# Patient Record
Sex: FEMALE | Race: WHITE | NOT HISPANIC OR LATINO | URBAN - METROPOLITAN AREA
[De-identification: names, ages, dates, MRNs, and addresses within clinical notes are randomized per-mention and may not be internally consistent; named-entity substitution may affect disease eponyms.]

---

## 2017-11-11 ENCOUNTER — EMERGENCY (EMERGENCY)
Facility: HOSPITAL | Age: 67
LOS: 1 days | Discharge: HOME | End: 2017-11-11
Attending: EMERGENCY MEDICINE | Admitting: EMERGENCY MEDICINE
Payer: MEDICARE

## 2017-11-11 VITALS
OXYGEN SATURATION: 100 % | SYSTOLIC BLOOD PRESSURE: 126 MMHG | HEIGHT: 64 IN | HEART RATE: 74 BPM | RESPIRATION RATE: 16 BRPM | WEIGHT: 119.05 LBS | DIASTOLIC BLOOD PRESSURE: 61 MMHG | TEMPERATURE: 98 F

## 2017-11-11 LAB
ALBUMIN SERPL ELPH-MCNC: 3.8 G/DL — SIGNIFICANT CHANGE UP (ref 3.3–5)
ALP SERPL-CCNC: 62 U/L — SIGNIFICANT CHANGE UP (ref 30–120)
ALT FLD-CCNC: 26 U/L DA — SIGNIFICANT CHANGE UP (ref 10–60)
ANION GAP SERPL CALC-SCNC: 8 MMOL/L — SIGNIFICANT CHANGE UP (ref 5–17)
APTT BLD: 30.2 SEC — SIGNIFICANT CHANGE UP (ref 27.5–37.4)
AST SERPL-CCNC: 25 U/L — SIGNIFICANT CHANGE UP (ref 10–40)
BASOPHILS # BLD AUTO: 0.1 K/UL — SIGNIFICANT CHANGE UP (ref 0–0.2)
BASOPHILS NFR BLD AUTO: 1.4 % — SIGNIFICANT CHANGE UP (ref 0–2)
BILIRUB SERPL-MCNC: 0.4 MG/DL — SIGNIFICANT CHANGE UP (ref 0.2–1.2)
BUN SERPL-MCNC: 20 MG/DL — SIGNIFICANT CHANGE UP (ref 7–23)
CALCIUM SERPL-MCNC: 9.3 MG/DL — SIGNIFICANT CHANGE UP (ref 8.4–10.5)
CHLORIDE SERPL-SCNC: 102 MMOL/L — SIGNIFICANT CHANGE UP (ref 96–108)
CK MB CFR SERPL CALC: 1 NG/ML — SIGNIFICANT CHANGE UP (ref 0–3.6)
CO2 SERPL-SCNC: 30 MMOL/L — SIGNIFICANT CHANGE UP (ref 22–31)
CREAT SERPL-MCNC: 0.95 MG/DL — SIGNIFICANT CHANGE UP (ref 0.5–1.3)
EOSINOPHIL # BLD AUTO: 0.1 K/UL — SIGNIFICANT CHANGE UP (ref 0–0.5)
EOSINOPHIL NFR BLD AUTO: 1.8 % — SIGNIFICANT CHANGE UP (ref 0–6)
GLUCOSE SERPL-MCNC: 115 MG/DL — HIGH (ref 70–99)
HCT VFR BLD CALC: 43.1 % — SIGNIFICANT CHANGE UP (ref 34.5–45)
HGB BLD-MCNC: 13.7 G/DL — SIGNIFICANT CHANGE UP (ref 11.5–15.5)
INR BLD: 0.99 RATIO — SIGNIFICANT CHANGE UP (ref 0.88–1.16)
LYMPHOCYTES # BLD AUTO: 2.5 K/UL — SIGNIFICANT CHANGE UP (ref 1–3.3)
LYMPHOCYTES # BLD AUTO: 37.8 % — SIGNIFICANT CHANGE UP (ref 13–44)
MCHC RBC-ENTMCNC: 30.1 PG — SIGNIFICANT CHANGE UP (ref 27–34)
MCHC RBC-ENTMCNC: 31.7 GM/DL — LOW (ref 32–36)
MCV RBC AUTO: 95 FL — SIGNIFICANT CHANGE UP (ref 80–100)
MONOCYTES # BLD AUTO: 0.5 K/UL — SIGNIFICANT CHANGE UP (ref 0–0.9)
MONOCYTES NFR BLD AUTO: 7.7 % — SIGNIFICANT CHANGE UP (ref 2–14)
NEUTROPHILS # BLD AUTO: 3.4 K/UL — SIGNIFICANT CHANGE UP (ref 1.8–7.4)
NEUTROPHILS NFR BLD AUTO: 51.2 % — SIGNIFICANT CHANGE UP (ref 43–77)
PLATELET # BLD AUTO: 253 K/UL — SIGNIFICANT CHANGE UP (ref 150–400)
POTASSIUM SERPL-MCNC: 3.2 MMOL/L — LOW (ref 3.5–5.3)
POTASSIUM SERPL-SCNC: 3.2 MMOL/L — LOW (ref 3.5–5.3)
PROT SERPL-MCNC: 7.7 G/DL — SIGNIFICANT CHANGE UP (ref 6–8.3)
PROTHROM AB SERPL-ACNC: 10.8 SEC — SIGNIFICANT CHANGE UP (ref 9.8–12.7)
RBC # BLD: 4.54 M/UL — SIGNIFICANT CHANGE UP (ref 3.8–5.2)
RBC # FLD: 12.6 % — SIGNIFICANT CHANGE UP (ref 10.3–14.5)
SODIUM SERPL-SCNC: 140 MMOL/L — SIGNIFICANT CHANGE UP (ref 135–145)
TROPONIN I SERPL-MCNC: 0 NG/ML — LOW (ref 0.02–0.06)
WBC # BLD: 6.7 K/UL — SIGNIFICANT CHANGE UP (ref 3.8–10.5)
WBC # FLD AUTO: 6.7 K/UL — SIGNIFICANT CHANGE UP (ref 3.8–10.5)

## 2017-11-11 PROCEDURE — 93005 ELECTROCARDIOGRAM TRACING: CPT

## 2017-11-11 PROCEDURE — 93010 ELECTROCARDIOGRAM REPORT: CPT

## 2017-11-11 PROCEDURE — 84484 ASSAY OF TROPONIN QUANT: CPT

## 2017-11-11 PROCEDURE — 80053 COMPREHEN METABOLIC PANEL: CPT

## 2017-11-11 PROCEDURE — 82553 CREATINE MB FRACTION: CPT

## 2017-11-11 PROCEDURE — 36415 COLL VENOUS BLD VENIPUNCTURE: CPT

## 2017-11-11 PROCEDURE — 99283 EMERGENCY DEPT VISIT LOW MDM: CPT | Mod: 25

## 2017-11-11 PROCEDURE — 99284 EMERGENCY DEPT VISIT MOD MDM: CPT

## 2017-11-11 PROCEDURE — 85610 PROTHROMBIN TIME: CPT

## 2017-11-11 PROCEDURE — 85730 THROMBOPLASTIN TIME PARTIAL: CPT

## 2017-11-11 PROCEDURE — 85027 COMPLETE CBC AUTOMATED: CPT

## 2017-11-11 RX ORDER — POTASSIUM CHLORIDE 20 MEQ
40 PACKET (EA) ORAL ONCE
Qty: 0 | Refills: 0 | Status: DISCONTINUED | OUTPATIENT
Start: 2017-11-11 | End: 2017-11-15

## 2017-11-11 NOTE — ED PROVIDER NOTE - OBJECTIVE STATEMENT
67 y/o F pt w/ no significant PMHx presents to the ED c/o lightheaded, dizziness and syncopal episode x approx. 1 hour ago. Pt states she ate a lot during the cocktail hour at a wedding and had what seemed like a very strong alcoholic drink. States she stood up to take a picture and she felt very dizzy so she sat back down and was told she passed out. Pt states she took Excedrin today because she gets migraines, denies any other medication use. States a couple years ago she had a workup for her heart but is unsure of what tests she had done. Pt denies any FHx of heart disease, tobacco use, fever or any other complaints at this time.

## 2017-11-11 NOTE — ED PROVIDER NOTE - PROGRESS NOTE DETAILS
Discussed all results with patient. Discussed option of d/c vs. repeat enzymes. Patient opting to leave, will f/u with pmd

## 2017-11-11 NOTE — ED PROVIDER NOTE - NS_ ATTENDINGSCRIBEDETAILS _ED_A_ED_FT
Curtis Vizcarra MD - The scribe's documentation has been prepared under my direction and personally reviewed by me in its entirety. I confirm that the note above accurately reflects all work, treatment, procedures, and medical decision making performed by me.

## 2017-11-11 NOTE — ED ADULT NURSE NOTE - NSSISCREENINGQ1_ED_A_ED
1. Have you been to the ER, urgent care clinic since your last visit? Hospitalized since your last visit? no  2. Have you seen or consulted any other health care providers outside of the [facility] since your last visit? Include any pap smears or colon screening.  no No

## 2019-12-30 PROBLEM — Z00.00 ENCOUNTER FOR PREVENTIVE HEALTH EXAMINATION: Status: ACTIVE | Noted: 2019-12-30

## 2020-01-06 ENCOUNTER — APPOINTMENT (OUTPATIENT)
Dept: UROGYNECOLOGY | Facility: CLINIC | Age: 70
End: 2020-01-06
Payer: MEDICARE

## 2020-01-06 VITALS
BODY MASS INDEX: 21.62 KG/M2 | DIASTOLIC BLOOD PRESSURE: 81 MMHG | WEIGHT: 122 LBS | HEIGHT: 63 IN | SYSTOLIC BLOOD PRESSURE: 155 MMHG

## 2020-01-06 DIAGNOSIS — Z83.3 FAMILY HISTORY OF DIABETES MELLITUS: ICD-10-CM

## 2020-01-06 DIAGNOSIS — Z87.42 PERSONAL HISTORY OF OTHER DISEASES OF THE FEMALE GENITAL TRACT: ICD-10-CM

## 2020-01-06 DIAGNOSIS — Z86.018 PERSONAL HISTORY OF OTHER BENIGN NEOPLASM: ICD-10-CM

## 2020-01-06 DIAGNOSIS — Z82.3 FAMILY HISTORY OF STROKE: ICD-10-CM

## 2020-01-06 DIAGNOSIS — E78.00 PURE HYPERCHOLESTEROLEMIA, UNSPECIFIED: ICD-10-CM

## 2020-01-06 DIAGNOSIS — Z80.3 FAMILY HISTORY OF MALIGNANT NEOPLASM OF BREAST: ICD-10-CM

## 2020-01-06 LAB
BILIRUB UR QL STRIP: NEGATIVE
CLARITY UR: NORMAL
COLLECTION METHOD: NORMAL
GLUCOSE UR-MCNC: NEGATIVE
HCG UR QL: 0.2 EU/DL
HGB UR QL STRIP.AUTO: NEGATIVE
KETONES UR-MCNC: NEGATIVE
LEUKOCYTE ESTERASE UR QL STRIP: NORMAL
NITRITE UR QL STRIP: NEGATIVE
PH UR STRIP: 6
PROT UR STRIP-MCNC: NEGATIVE
SP GR UR STRIP: 1.01

## 2020-01-06 PROCEDURE — 99204 OFFICE O/P NEW MOD 45 MIN: CPT | Mod: 25

## 2020-01-06 PROCEDURE — 51701 INSERT BLADDER CATHETER: CPT

## 2020-01-06 PROCEDURE — 81003 URINALYSIS AUTO W/O SCOPE: CPT | Mod: QW

## 2020-01-06 RX ORDER — CALCIUM CITRATE/VITAMIN D3 315MG-6.25
TABLET ORAL
Refills: 0 | Status: ACTIVE | COMMUNITY

## 2020-01-06 RX ORDER — MULTIVIT-MIN/IRON/FOLIC ACID/K 18-600-40
CAPSULE ORAL
Refills: 0 | Status: ACTIVE | COMMUNITY

## 2020-01-06 RX ORDER — VITAMIN B COMPLEX
TABLET ORAL
Refills: 0 | Status: ACTIVE | COMMUNITY

## 2020-01-06 RX ORDER — MULTIVITAMIN
TABLET ORAL
Refills: 0 | Status: ACTIVE | COMMUNITY

## 2020-01-06 RX ORDER — ATORVASTATIN CALCIUM 80 MG/1
TABLET, FILM COATED ORAL
Refills: 0 | Status: ACTIVE | COMMUNITY

## 2020-01-06 RX ORDER — ECHINACEA 400 MG
CAPSULE ORAL
Refills: 0 | Status: ACTIVE | COMMUNITY

## 2020-01-06 NOTE — PHYSICAL EXAM
[No Acute Distress] : in no acute distress [Well Nourished] : ~L well nourished [Well developed] : well developed [Oriented x3] : oriented to person, place, and time [No Edema] : ~T edema was not present [Warm and Dry] : was warm and dry to touch [Normal Gait] : gait was normal [Normal Appearance] : general appearance was normal [Atrophy] : atrophy [2] : 2 [Aa ____] : Aa [unfilled] [Ba ____] : Ba [unfilled] [GH ____] : GH [unfilled] [C ____] : C [unfilled] [PB ____] : PB [unfilled] [Ap ____] : Ap [unfilled] [TVL ____] : TVL  [unfilled] [Bp ____] : Bp [unfilled] [D ____] : D [unfilled] [Normal] : normal [Post Void Residual ____ml] : post void residual via catheterization was [unfilled] ml [Cough] : no cough [Distended] : not distended [Tenderness] : ~T no ~M abdominal tenderness observed [Hernia] : no hernia observed [FreeTextEntry3] : Negative empty stress test

## 2020-01-06 NOTE — OB HISTORY
[Vaginal ___] : [unfilled] vaginal delivery(s) [Definite ___ (Date)] : the last menstrual period was [unfilled] [Last Pap Smear ___] : date of last pap smear was on [unfilled] [Abnormal Pap Smear] : normal pap smear [Taking Estrogens] : is not taking estrogen replacement [Sexually Active] : is not sexually active [FreeTextEntry1] : Baby weighed 7#13 vacuum assisted.

## 2020-01-06 NOTE — DISCUSSION/SUMMARY
[FreeTextEntry1] : Mrs. REESE is a 69-year-old with uterovaginal prolapse, midline cystocele, occult stress incontinence, elevated postvoid residual. We discussed the kinking of her urethra which can account for her elvated PVR and the occult BRENDA. We talked about the etiology and natural progression of pelvic organ prolapse. We discussed the treatment options of a pessary, physical therapy or surgery. In terms of surgery we talked about open procedures, robotic assisted procedures and vaginal reconstruction with or without the utilization of graft material. I recommended bladder testing if she desires surgery. I asked her to sign a release to obtain her most recent pelvic U/S. I gave her some literature on pelvic floor muscle strengthening. I sent her urine to the lab. At this time she wants to hold off on surgery. I referred her for PT and asked her to come back in  6 months to reevaluate her situation. I was able to answer all of her questions. We discussed trying a different pessary and one with a knob but at this time she declined. \par

## 2020-01-06 NOTE — LETTER BODY
[Dear  ___] : Dear  [unfilled], [I had the pleasure of evaluating your patient, [unfilled]. Thank you for referring Ms. [unfilled] for consultation for ___] : I had the pleasure of evaluating your patient, [unfilled]. Thank you for referring Ms. [unfilled] for consultation for [unfilled]. [Attached please find my note.] : Attached please find my note. [Thank you very much for allowing me to participate in the care of this patient. If you have any questions, please do not hesitate to contact me] : Thank you very much for allowing me to participate in the care of this patient. If you have any questions, please do not hesitate to contact me. [DrDiego  ___] : Dr. PAZ

## 2020-01-06 NOTE — HISTORY OF PRESENT ILLNESS
[FreeTextEntry1] : 68 yo  female with complaints of prolapse. Told by GYN that her uterus has been prolapsing for years but seems to have gotten worse over the last 2 years. She is very active, horseback rides and exercises. Told she was a stage 3 or 4. She tried a pessary but she started to leak with it in so she had it taken out. She denies pain but she does feel heaviness vaginally. Sometimes will feel strong urge and nothing will come out. Feels she voids frequently. Gets up 2-3 times at night. No leaking, except when the pessary was in. Has some urgency. Not wearing pads. No bowel complaints. Has regular pelvic U/S for fibroids - the last one was within the last year. Went to 1 session of PT and was told it would only strengthen her core.

## 2020-01-07 ENCOUNTER — RESULT REVIEW (OUTPATIENT)
Age: 70
End: 2020-01-07

## 2020-01-08 ENCOUNTER — RESULT REVIEW (OUTPATIENT)
Age: 70
End: 2020-01-08

## 2020-07-22 DIAGNOSIS — N39.0 URINARY TRACT INFECTION, SITE NOT SPECIFIED: ICD-10-CM

## 2020-09-21 ENCOUNTER — APPOINTMENT (OUTPATIENT)
Dept: UROGYNECOLOGY | Facility: CLINIC | Age: 70
End: 2020-09-21

## 2020-09-25 LAB
APPEARANCE: CLEAR
BACTERIA UR CULT: NORMAL
BACTERIA: NEGATIVE
BILIRUBIN URINE: NEGATIVE
BLOOD URINE: NEGATIVE
COLOR: COLORLESS
GLUCOSE QUALITATIVE U: NEGATIVE
HYALINE CASTS: 0 /LPF
KETONES URINE: NEGATIVE
LEUKOCYTE ESTERASE URINE: NEGATIVE
MICROSCOPIC-UA: NORMAL
NITRITE URINE: NEGATIVE
PH URINE: 6.5
PROTEIN URINE: NEGATIVE
RED BLOOD CELLS URINE: 0 /HPF
SPECIFIC GRAVITY URINE: 1
SQUAMOUS EPITHELIAL CELLS: 0 /HPF
UROBILINOGEN URINE: NORMAL
WHITE BLOOD CELLS URINE: 0 /HPF

## 2022-02-18 ENCOUNTER — APPOINTMENT (OUTPATIENT)
Dept: OPHTHALMOLOGY | Facility: CLINIC | Age: 72
End: 2022-02-18
Payer: MEDICARE

## 2022-02-18 ENCOUNTER — NON-APPOINTMENT (OUTPATIENT)
Age: 72
End: 2022-02-18

## 2022-02-18 PROCEDURE — 92020 GONIOSCOPY: CPT

## 2022-02-18 PROCEDURE — 92004 COMPRE OPH EXAM NEW PT 1/>: CPT

## 2022-02-18 PROCEDURE — 92133 CPTRZD OPH DX IMG PST SGM ON: CPT

## 2022-03-23 ENCOUNTER — APPOINTMENT (OUTPATIENT)
Dept: OPHTHALMOLOGY | Facility: CLINIC | Age: 72
End: 2022-03-23
Payer: MEDICARE

## 2022-03-23 ENCOUNTER — NON-APPOINTMENT (OUTPATIENT)
Age: 72
End: 2022-03-23

## 2022-03-23 PROCEDURE — 92020 GONIOSCOPY: CPT

## 2022-03-23 PROCEDURE — 92012 INTRM OPH EXAM EST PATIENT: CPT

## 2022-05-20 ENCOUNTER — NON-APPOINTMENT (OUTPATIENT)
Age: 72
End: 2022-05-20

## 2022-05-20 ENCOUNTER — APPOINTMENT (OUTPATIENT)
Dept: OPHTHALMOLOGY | Facility: CLINIC | Age: 72
End: 2022-05-20
Payer: MEDICARE

## 2022-05-20 PROCEDURE — 92083 EXTENDED VISUAL FIELD XM: CPT

## 2022-05-20 PROCEDURE — 92012 INTRM OPH EXAM EST PATIENT: CPT

## 2023-01-27 ENCOUNTER — NON-APPOINTMENT (OUTPATIENT)
Age: 73
End: 2023-01-27

## 2023-01-27 ENCOUNTER — APPOINTMENT (OUTPATIENT)
Dept: OPHTHALMOLOGY | Facility: CLINIC | Age: 73
End: 2023-01-27
Payer: MEDICARE

## 2023-01-27 PROCEDURE — 92014 COMPRE OPH EXAM EST PT 1/>: CPT

## 2023-01-27 PROCEDURE — 92133 CPTRZD OPH DX IMG PST SGM ON: CPT

## 2023-05-05 ENCOUNTER — APPOINTMENT (OUTPATIENT)
Dept: OPHTHALMOLOGY | Facility: CLINIC | Age: 73
End: 2023-05-05

## 2023-06-12 PROBLEM — G43.909 MIGRAINE: Status: ACTIVE | Noted: 2023-06-12

## 2023-06-12 PROBLEM — R55 SYNCOPE: Status: ACTIVE | Noted: 2023-06-12

## 2023-06-12 PROBLEM — E78.00 HYPERCHOLESTEREMIA: Status: ACTIVE | Noted: 2023-06-12

## 2023-06-12 PROBLEM — R35.0 FREQUENT URINATION: Status: ACTIVE | Noted: 2020-01-06

## 2023-06-12 PROBLEM — R35.1 NOCTURIA: Status: ACTIVE | Noted: 2020-01-06

## 2023-06-15 ENCOUNTER — RESULT CHARGE (OUTPATIENT)
Age: 73
End: 2023-06-15

## 2023-06-15 ENCOUNTER — APPOINTMENT (OUTPATIENT)
Dept: UROGYNECOLOGY | Facility: CLINIC | Age: 73
End: 2023-06-15
Payer: MEDICARE

## 2023-06-15 DIAGNOSIS — E78.00 PURE HYPERCHOLESTEROLEMIA, UNSPECIFIED: ICD-10-CM

## 2023-06-15 DIAGNOSIS — R35.1 NOCTURIA: ICD-10-CM

## 2023-06-15 DIAGNOSIS — G43.909 MIGRAINE, UNSPECIFIED, NOT INTRACTABLE, W/OUT STATUS MIGRAINOSUS: ICD-10-CM

## 2023-06-15 DIAGNOSIS — R55 SYNCOPE AND COLLAPSE: ICD-10-CM

## 2023-06-15 DIAGNOSIS — R35.0 FREQUENCY OF MICTURITION: ICD-10-CM

## 2023-06-15 DIAGNOSIS — R32 UNSPECIFIED URINARY INCONTINENCE: ICD-10-CM

## 2023-06-15 LAB
BILIRUB UR QL STRIP: NEGATIVE
CLARITY UR: CLEAR
COLLECTION METHOD: NORMAL
GLUCOSE UR-MCNC: NEGATIVE
HCG UR QL: 0.2 EU/DL
HGB UR QL STRIP.AUTO: NEGATIVE
KETONES UR-MCNC: NEGATIVE
LEUKOCYTE ESTERASE UR QL STRIP: NORMAL
NITRITE UR QL STRIP: POSITIVE
PH UR STRIP: 5.5
PROT UR STRIP-MCNC: NEGATIVE
SP GR UR STRIP: 1.01

## 2023-06-15 PROCEDURE — 81003 URINALYSIS AUTO W/O SCOPE: CPT | Mod: QW

## 2023-06-15 PROCEDURE — 51701 INSERT BLADDER CATHETER: CPT | Mod: 59

## 2023-06-15 PROCEDURE — 99204 OFFICE O/P NEW MOD 45 MIN: CPT | Mod: 25

## 2023-06-15 NOTE — HISTORY OF PRESENT ILLNESS
[FreeTextEntry1] : 71 yo with long history of prolapse. She saw me over 3 years ago and decided at that time wanted to do PT but then COVID happened. She feels the prolapse is getting much worse. She has significant urgency and frequency. She is not leaking any urine. Not wearing pads. She had episode of vaginal spotting and had an U/S and a biopsy. The U/S showed normal ovaries and endometrium 4mm and multiple small fibroids. The biopsy report showed inactive endometrium. She took a mirror and saw there was a small tear on the edge of her vagina. She did try a pessary years ago and she started to leak a lot so she removed.

## 2023-06-15 NOTE — PHYSICAL EXAM
[Chaperone Present] : A chaperone was present in the examining room during all aspects of the physical examination [No Acute Distress] : in no acute distress [Well developed] : well developed [Well Nourished] : ~L well nourished [Oriented x3] : oriented to person, place, and time [No Edema] : ~T edema was not present [Warm and Dry] : was warm and dry to touch [Normal Gait] : gait was normal [Normal Appearance] : general appearance was normal [Atrophy] : atrophy [2] : 2 [Aa ____] : Aa [unfilled] [Ba ____] : Ba [unfilled] [C ____] : C [unfilled] [GH ____] : GH [unfilled] [PB ____] : PB [unfilled] [TVL ____] : TVL  [unfilled] [Ap ____] : Ap [unfilled] [Bp ____] : Bp [unfilled] [D ____] : D [unfilled] [Normal] : no abnormalities [Post Void Residual ____ml] : post void residual was [unfilled] ml [Cough] : no cough

## 2023-06-15 NOTE — DISCUSSION/SUMMARY
[FreeTextEntry1] : Ms. REESE is 72 with worsening POP, frequency, urgency, elevated PVR. We talked about the etiology and natural progression of pelvic organ prolapse. We discussed the treatment options of a pessary, physical therapy or surgery. In terms of surgery we talked about open procedures, robotic assisted procedures and vaginal reconstruction with or without the utilization of graft material. We discussed what is involved with presurgical testing if she decided for surgery and the additional blood work they would order. We also discussed what is involved in recovery, post-op restrictions including restrictions on lifting, submerging in water and exercise. I recommended bladder testing UDTs. I gave her some literature on SCP. I sent her urine to the lab. I do think she would be a good candidate for a robotic repair and we discussed the benefits of lowered risk of failure when we augment the repair with mesh. I was able to answer all of her questions.\par

## 2023-06-15 NOTE — REASON FOR VISIT
[Questionnaire Received] : Patient questionnaire received [Pelvic Organ Prolapse] : pelvic organ prolapse [Urine Frequency] : urine frequency

## 2023-06-15 NOTE — OB HISTORY
[Vaginal ___] : [unfilled] vaginal delivery(s) [Approximately ___ (Month)] : the LMP was approximately [unfilled] month(s) ago [Last Pap Smear ___] : date of last pap smear was on [unfilled] [Sexually Active] : sexually active [Abnormal Pap Smear] : normal pap smear [Taking Estrogens] : is not taking estrogen replacement [FreeTextEntry1] : largest baby 7lbs 11oz

## 2023-06-17 LAB
APPEARANCE: CLEAR
BACTERIA: ABNORMAL /HPF
BILIRUBIN URINE: NEGATIVE
BLOOD URINE: NEGATIVE
CAST: 0 /LPF
COLOR: YELLOW
EPITHELIAL CELLS: 0 /HPF
GLUCOSE QUALITATIVE U: NEGATIVE MG/DL
KETONES URINE: NEGATIVE MG/DL
LEUKOCYTE ESTERASE URINE: ABNORMAL
MICROSCOPIC-UA: NORMAL
NITRITE URINE: NEGATIVE
PH URINE: 6
PROTEIN URINE: NEGATIVE MG/DL
RED BLOOD CELLS URINE: 0 /HPF
SPECIFIC GRAVITY URINE: 1.01
UROBILINOGEN URINE: 0.2 MG/DL
WHITE BLOOD CELLS URINE: 26 /HPF

## 2023-06-19 LAB — BACTERIA UR CULT: ABNORMAL

## 2023-06-19 RX ORDER — NITROFURANTOIN (MONOHYDRATE/MACROCRYSTALS) 25; 75 MG/1; MG/1
100 CAPSULE ORAL
Qty: 14 | Refills: 0 | Status: ACTIVE | COMMUNITY
Start: 2023-06-19 | End: 1900-01-01

## 2023-07-11 ENCOUNTER — APPOINTMENT (OUTPATIENT)
Dept: UROGYNECOLOGY | Facility: CLINIC | Age: 73
End: 2023-07-11

## 2023-07-19 ENCOUNTER — APPOINTMENT (OUTPATIENT)
Dept: UROGYNECOLOGY | Facility: CLINIC | Age: 73
End: 2023-07-19
Payer: MEDICARE

## 2023-07-19 DIAGNOSIS — N39.3 STRESS INCONTINENCE (FEMALE) (MALE): ICD-10-CM

## 2023-07-19 PROCEDURE — 51797 INTRAABDOMINAL PRESSURE TEST: CPT

## 2023-07-19 PROCEDURE — 51729 CYSTOMETROGRAM W/VP&UP: CPT

## 2023-07-19 PROCEDURE — 51741 ELECTRO-UROFLOWMETRY FIRST: CPT

## 2023-08-31 ENCOUNTER — APPOINTMENT (OUTPATIENT)
Dept: UROGYNECOLOGY | Facility: CLINIC | Age: 73
End: 2023-08-31
Payer: MEDICARE

## 2023-08-31 ENCOUNTER — OUTPATIENT (OUTPATIENT)
Dept: OUTPATIENT SERVICES | Facility: HOSPITAL | Age: 73
LOS: 1 days | End: 2023-08-31
Payer: MEDICARE

## 2023-08-31 VITALS
HEIGHT: 63 IN | TEMPERATURE: 98 F | SYSTOLIC BLOOD PRESSURE: 132 MMHG | RESPIRATION RATE: 16 BRPM | HEART RATE: 83 BPM | DIASTOLIC BLOOD PRESSURE: 80 MMHG | OXYGEN SATURATION: 99 % | WEIGHT: 115.08 LBS

## 2023-08-31 DIAGNOSIS — N81.9 FEMALE GENITAL PROLAPSE, UNSPECIFIED: ICD-10-CM

## 2023-08-31 DIAGNOSIS — Z29.9 ENCOUNTER FOR PROPHYLACTIC MEASURES, UNSPECIFIED: ICD-10-CM

## 2023-08-31 DIAGNOSIS — Z01.818 ENCOUNTER FOR OTHER PREPROCEDURAL EXAMINATION: ICD-10-CM

## 2023-08-31 DIAGNOSIS — Z90.49 ACQUIRED ABSENCE OF OTHER SPECIFIED PARTS OF DIGESTIVE TRACT: Chronic | ICD-10-CM

## 2023-08-31 DIAGNOSIS — Z13.89 ENCOUNTER FOR SCREENING FOR OTHER DISORDER: ICD-10-CM

## 2023-08-31 DIAGNOSIS — Z98.890 OTHER SPECIFIED POSTPROCEDURAL STATES: Chronic | ICD-10-CM

## 2023-08-31 LAB
A1C WITH ESTIMATED AVERAGE GLUCOSE RESULT: 5.5 % — SIGNIFICANT CHANGE UP (ref 4–5.6)
ANION GAP SERPL CALC-SCNC: 11 MMOL/L — SIGNIFICANT CHANGE UP (ref 5–17)
APPEARANCE UR: CLEAR — SIGNIFICANT CHANGE UP
APTT BLD: 37.5 SEC — HIGH (ref 24.5–35.6)
BACTERIA # UR AUTO: ABNORMAL
BASOPHILS # BLD AUTO: 0.03 K/UL — SIGNIFICANT CHANGE UP (ref 0–0.2)
BASOPHILS NFR BLD AUTO: 0.5 % — SIGNIFICANT CHANGE UP (ref 0–2)
BILIRUB UR-MCNC: NEGATIVE — SIGNIFICANT CHANGE UP
BLD GP AB SCN SERPL QL: SIGNIFICANT CHANGE UP
BUN SERPL-MCNC: 10.9 MG/DL — SIGNIFICANT CHANGE UP (ref 8–20)
CALCIUM SERPL-MCNC: 9.3 MG/DL — SIGNIFICANT CHANGE UP (ref 8.4–10.5)
CHLORIDE SERPL-SCNC: 102 MMOL/L — SIGNIFICANT CHANGE UP (ref 96–108)
CO2 SERPL-SCNC: 26 MMOL/L — SIGNIFICANT CHANGE UP (ref 22–29)
COLOR SPEC: YELLOW — SIGNIFICANT CHANGE UP
CREAT SERPL-MCNC: 0.74 MG/DL — SIGNIFICANT CHANGE UP (ref 0.5–1.3)
DIFF PNL FLD: NEGATIVE — SIGNIFICANT CHANGE UP
EGFR: 86 ML/MIN/1.73M2 — SIGNIFICANT CHANGE UP
EOSINOPHIL # BLD AUTO: 0.04 K/UL — SIGNIFICANT CHANGE UP (ref 0–0.5)
EOSINOPHIL NFR BLD AUTO: 0.6 % — SIGNIFICANT CHANGE UP (ref 0–6)
EPI CELLS # UR: SIGNIFICANT CHANGE UP
ESTIMATED AVERAGE GLUCOSE: 111 MG/DL — SIGNIFICANT CHANGE UP (ref 68–114)
GLUCOSE SERPL-MCNC: 80 MG/DL — SIGNIFICANT CHANGE UP (ref 70–99)
GLUCOSE UR QL: NEGATIVE MG/DL — SIGNIFICANT CHANGE UP
HCT VFR BLD CALC: 41.8 % — SIGNIFICANT CHANGE UP (ref 34.5–45)
HGB BLD-MCNC: 14 G/DL — SIGNIFICANT CHANGE UP (ref 11.5–15.5)
IMM GRANULOCYTES NFR BLD AUTO: 0.2 % — SIGNIFICANT CHANGE UP (ref 0–0.9)
INR BLD: 0.99 RATIO — SIGNIFICANT CHANGE UP (ref 0.85–1.18)
KETONES UR-MCNC: NEGATIVE — SIGNIFICANT CHANGE UP
LEUKOCYTE ESTERASE UR-ACNC: ABNORMAL
LYMPHOCYTES # BLD AUTO: 0.92 K/UL — LOW (ref 1–3.3)
LYMPHOCYTES # BLD AUTO: 14.3 % — SIGNIFICANT CHANGE UP (ref 13–44)
MCHC RBC-ENTMCNC: 30.8 PG — SIGNIFICANT CHANGE UP (ref 27–34)
MCHC RBC-ENTMCNC: 33.5 GM/DL — SIGNIFICANT CHANGE UP (ref 32–36)
MCV RBC AUTO: 92.1 FL — SIGNIFICANT CHANGE UP (ref 80–100)
MONOCYTES # BLD AUTO: 0.9 K/UL — SIGNIFICANT CHANGE UP (ref 0–0.9)
MONOCYTES NFR BLD AUTO: 14 % — SIGNIFICANT CHANGE UP (ref 2–14)
NEUTROPHILS # BLD AUTO: 4.52 K/UL — SIGNIFICANT CHANGE UP (ref 1.8–7.4)
NEUTROPHILS NFR BLD AUTO: 70.4 % — SIGNIFICANT CHANGE UP (ref 43–77)
NITRITE UR-MCNC: NEGATIVE — SIGNIFICANT CHANGE UP
PH UR: 6.5 — SIGNIFICANT CHANGE UP (ref 5–8)
PLATELET # BLD AUTO: 234 K/UL — SIGNIFICANT CHANGE UP (ref 150–400)
POTASSIUM SERPL-MCNC: 4.4 MMOL/L — SIGNIFICANT CHANGE UP (ref 3.5–5.3)
POTASSIUM SERPL-SCNC: 4.4 MMOL/L — SIGNIFICANT CHANGE UP (ref 3.5–5.3)
PROT UR-MCNC: NEGATIVE MG/DL — SIGNIFICANT CHANGE UP
PROTHROM AB SERPL-ACNC: 11 SEC — SIGNIFICANT CHANGE UP (ref 9.5–13)
RBC # BLD: 4.54 M/UL — SIGNIFICANT CHANGE UP (ref 3.8–5.2)
RBC # FLD: 13.3 % — SIGNIFICANT CHANGE UP (ref 10.3–14.5)
RBC CASTS # UR COMP ASSIST: NEGATIVE /HPF — SIGNIFICANT CHANGE UP (ref 0–4)
SODIUM SERPL-SCNC: 139 MMOL/L — SIGNIFICANT CHANGE UP (ref 135–145)
SP GR SPEC: 1 — LOW (ref 1.01–1.02)
UROBILINOGEN FLD QL: NEGATIVE MG/DL — SIGNIFICANT CHANGE UP
WBC # BLD: 6.42 K/UL — SIGNIFICANT CHANGE UP (ref 3.8–10.5)
WBC # FLD AUTO: 6.42 K/UL — SIGNIFICANT CHANGE UP (ref 3.8–10.5)
WBC UR QL: SIGNIFICANT CHANGE UP /HPF (ref 0–5)

## 2023-08-31 PROCEDURE — 93005 ELECTROCARDIOGRAM TRACING: CPT

## 2023-08-31 PROCEDURE — G0463: CPT

## 2023-08-31 PROCEDURE — 99214 OFFICE O/P EST MOD 30 MIN: CPT

## 2023-08-31 PROCEDURE — 93010 ELECTROCARDIOGRAM REPORT: CPT

## 2023-08-31 RX ORDER — NITROFURANTOIN (MONOHYDRATE/MACROCRYSTALS) 25; 75 MG/1; MG/1
100 CAPSULE ORAL TWICE DAILY
Qty: 10 | Refills: 0 | Status: ACTIVE | COMMUNITY
Start: 2023-08-31 | End: 1900-01-01

## 2023-08-31 NOTE — HISTORY OF PRESENT ILLNESS
[FreeTextEntry1] : Here to discuss surgery.  Valentine has a long history of pelvic organ prolapse.  She tried a pessary and developed leakage, She was doing physical therapy on her own.  She has decided she wants to pursue definitive surgical correction.  We reviewed her UDTs that showed BRENDA. I reveiwed her ultrasound which was normal other than a few small fibroids. R/B/A BSO d/w patient and she wants tubes out and ovaries left in.

## 2023-08-31 NOTE — H&P PST ADULT - NEGATIVE GENERAL GENITOURINARY SYMPTOMS
no hematuria/no renal colic/no flank pain L/no flank pain R/no urine discoloration/no incontinence/no dysuria

## 2023-08-31 NOTE — H&P PST ADULT - ASSESSMENT
This is a pleasant 72 year old  female presenting today for PST, PMH includes migraines and high cholesterol. Reports longstanding history of bladder prolapse, was scheduled previously but due to COVID procedure was postponed. She feels the prolapse is getting much worse. She has significant urgency and frequency.  Patient reports increased urinary frequency, urgency and "pressure".  Denies dysuria and hematuria. Denies fever or chills.  Scheduled for robotic supracervical hysterectomy with bilateral salpingo-oophorectomy, robotic sacrocolpopexy, cystoscopy, suburethral sling on 2023 with Dr. Toth pending medical and cardiac clearance.         CAPRINI SCORE    AGE RELATED RISK FACTORS                                                             [ ] Age 41-60 years                                            (1 Point)  [X ] Age: 61-74 years                                           (2 Points)                 [ ] Age= 75 years                                                (3 Points)             DISEASE RELATED RISK FACTORS                                                       [ ] Edema in the lower extremities                 (1 Point)                     [ ] Varicose veins                                               (1 Point)                                 [ ] BMI > 25 Kg/m2                                            (1 Point)                                  [ ] Serious infection (ie PNA)                            (1 Point)                     [ ] Lung disease ( COPD, Emphysema)            (1 Point)                                                                          [ ] Acute myocardial infarction                         (1 Point)                  [ ] Congestive heart failure (in the previous month)  (1 Point)         [ ] Inflammatory bowel disease                            (1 Point)                  [ ] Central venous access, PICC or Port               (2 points)       (within the last month)                                                                [ ] Stroke (in the previous month)                        (5 Points)    [ ] Previous or present malignancy                       (2 points)                                                                                                                                                         HEMATOLOGY RELATED FACTORS                                                         [ ] Prior episodes of VTE                                     (3 Points)                     [ ] Positive family history for VTE                      (3 Points)                  [ ] Prothrombin 44390 A                                     (3 Points)                     [ ] Factor V Leiden                                                (3 Points)                        [ ] Lupus anticoagulants                                      (3 Points)                                                           [ ] Anticardiolipin antibodies                              (3 Points)                                                       [ ] High homocysteine in the blood                   (3 Points)                                             [ ] Other congenital or acquired thrombophilia      (3 Points)                                                [ ] Heparin induced thrombocytopenia                  (3 Points)                                        MOBILITY RELATED FACTORS  [ ] Bed rest                                                         (1 Point)  [ ] Plaster cast                                                    (2 points)  [ ] Bed bound for more than 72 hours           (2 Points)    GENDER SPECIFIC FACTORS  [ ] Pregnancy or had a baby within the last month   (1 Point)  [ ] Post-partum < 6 weeks                                   (1 Point)  [ ] Hormonal therapy  or oral contraception   (1 Point)  [ ] History of pregnancy complications              (1 point)  [ ] Unexplained or recurrent              (1 Point)    OTHER RISK FACTORS                                           (1 Point)  [X ] BMI >40, smoking, diabetes requiring insulin, chemotherapy  blood transfusions and length of surgery over 2 hours    SURGERY RELATED RISK FACTORS  [ ]  Section within the last month     (1 Point)  [ ] Minor surgery                                                  (1 Point)  [ ] Arthroscopic surgery                                       (2 Points)  [X ] Planned major surgery lasting more            (2 Points)      than 45 minutes     [ ] Elective hip or knee joint replacement       (5 points)       surgery                                                TRAUMA RELATED RISK FACTORS  [ ] Fracture of the hip, pelvis, or leg                       (5 Points)  [ ] Spinal cord injury resulting in paralysis             (5 points)       (in the previous month)    [ ] Paralysis  (less than 1 month)                             (5 Points)  [ ] Multiple Trauma within 1 month                        (5 Points)    Total Score [    5    ]    Caprini Score 0-2: Low Risk, NO VTE prophylaxis required for most patients, encourage ambulation  Caprini Score 3-6: Moderate Risk , pharmacologic VTE prophylaxis is indicated for most patients (in the absence of contraindications)  Caprini Score Greater than or =7: High risk, pharmocologic VTE prophylaxis indicated for most patients (in the absence of contraindications)      OPIOID RISK TOOL    [place] EACH BOX THAT APPLIES AND ADD TOTALS AT THE END    FAMILY HISTORY OF SUBSTANCE ABUSE                 FEMALE         MALE                                                Alcohol                             [  ]1 pt          [  ]3pts                                               Illegal Durgs                     [  ]2 pts        [  ]3pts                                               Rx Drugs                           [  ]4 pts        [  ]4 pts    PERSONAL HISTORY OF SUBSTANCE ABUSE                                                                                          Alcohol                             [  ]3 pts       [  ]3 pts                                               Illegal Drugs                     [  ]4 pts        [  ]4 pts                                               Rx Drugs                           [  ]5 pts        [  ]5 pts    AGE BETWEEN 16-45 YEARS                                      [  ]1 pt         [  ]1 pt    HISTORY OF PREADOLESCENT   SEXUAL ABUSE                                                             [  ]3 pts        [  ]0pts    PSYCHOLOGICAL DISEASE                     ADD, OCD, Bipolar, Schizophrenia        [  ]2 pts         [  ]2 pts                      Depression                                               [  ]1 pt           [  ]1 pt           SCORING TOTAL   (add numbers and type here)              (*0**)                                     A score of 3 or lower indicated LOW risk for future opioid abuse  A score of 4 to 7 indicated moderate risk for future opioid abuse  A score of 8 or higher indicates a high risk for opioid abuse

## 2023-08-31 NOTE — H&P PST ADULT - HISTORY OF PRESENT ILLNESS
Patient is a  72 year old  female presenting today for PST, PMH includes  Scheduled for robotic supracervical hysterectomy with bilateral salpingo-oophorectomy, robotic sacrocolpopexy, cystoscopy, suburethral sling on 9/20/2023 with Dr. Toth pending medical and cardiac clearance.    long history of prolapse. She saw me over 3 years ago and decided at that time wanted to do PT but then COVID happened. She feels the prolapse is getting much worse. She has significant urgency and frequency. She is not leaking any urine.  Patient is a  72 year old  female presenting today for PST, PMH includes migraines and high cholesterol. Reports longstanding history of bladder prolapse, was scheduled previously but due to COVID procedure was postponed. She feels the prolapse is getting much worse. She has significant urgency and frequency.  Patient reports increased urinary frequency, urgency and "pressure".  Denies dysuria and hematuria. Denies fever or chills.  Scheduled for robotic supracervical hysterectomy with bilateral salpingo-oophorectomy, robotic sacrocolpopexy, cystoscopy, suburethral sling on 9/20/2023 with Dr. Toth pending medical and cardiac clearance.

## 2023-08-31 NOTE — H&P PST ADULT - NSANTHOSAYNRD_GEN_A_CORE
No. ALEXX screening performed.  STOP BANG Legend: 0-2 = LOW Risk; 3-4 = INTERMEDIATE Risk; 5-8 = HIGH Risk

## 2023-08-31 NOTE — H&P PST ADULT - NSICDXFAMILYHX_GEN_ALL_CORE_FT
FAMILY HISTORY:  Father  Still living? Unknown  Family history of high cholesterol, Age at diagnosis: Age Unknown  FH: HTN (hypertension), Age at diagnosis: Age Unknown    Mother  Still living? Unknown  Family history of high cholesterol, Age at diagnosis: Age Unknown  FH: HTN (hypertension), Age at diagnosis: Age Unknown    Sibling  Still living? Unknown  Family history of high cholesterol, Age at diagnosis: Age Unknown  FH: HTN (hypertension), Age at diagnosis: Age Unknown  FHx: diabetes mellitus, Age at diagnosis: Age Unknown

## 2023-08-31 NOTE — DISCUSSION/SUMMARY
[FreeTextEntry1] : Ms. REESE presented to the office today for counseling regarding her decision for possible pelvic reconstructive surgery. All pertinent prior studies including urodynamic were reviewed. 						 She was counseled regarding alternative non-surgical therapies as well as the prognosis with no intervention.  The patient was advised regarding various surgical options including abdominal, robotic/laparascopic and vaginal approaches. The risks and benefits of surgery using endogenous tissue only versus the use of graft insertion (mesh) were fully reviewed.  She was informed of the potential for improved durability and the inherent risk of graft use including but not limited to infection, erosion, pain, pain with intercourse, cervical elongation, disturbance in bowel or bladder function, any of which may require additional surgery for mesh revision.  She is aware that the mesh used in her surgery is a permanent mesh.    The general risks of the surgery were reviewed including, but not limited to infection, bleeding, surrounding organ or tissue injury, failure meaning recurrent prolapse, leaking, voiding dysfunction, needing to go home with a catheter and the risks of anesthesia.  The approximate length of the surgery, hospital stay and postoperative recovery period were reviewed, including a general overview of convalescence and postoperative followup.  The patient is aware that learner's (medical students/residents/fellows) may be participating in a pelvic exam under anesthesia.  The patient verbalized a desire to proceed with the surgery.  Appropriate informed consent was obtained for robotic osiel bs scp sling and cysto.  All questions were answered to the patient's satisfaction and we are looking to schedule her.

## 2023-08-31 NOTE — H&P PST ADULT - RESPIRATORY RATE (BREATHS/MIN)
Please notify the grandmother (Lashanda Salguero) that his chart has been printed out at her and is ready for .    16

## 2023-08-31 NOTE — H&P PST ADULT - NSANTHOBSERVEDRD_ENT_A_CORE
No
D/W Dr. Shukla  D/C Home  No evidence of acute process or labor at this time  Normal fetal testing  Follow up at next scheduled prenatal visit at [facility]  Return for decreased fetal movement, loss of fluid or vaginal bleeding  Tylenol every 6 hours for throat soreness as directed  Plain Over the Counter Robitussin as directed for cough  Signs and Symptoms of labor reviewed

## 2023-08-31 NOTE — H&P PST ADULT - NSICDXPASTSURGICALHX_GEN_ALL_CORE_FT
PAST SURGICAL HISTORY:  History of ankle surgery     History of cholecystectomy     History of hysteroscopy

## 2023-08-31 NOTE — H&P PST ADULT - PROBLEM SELECTOR PLAN 1
Labs, EKG, UA and U C&S performed.  Scheduled for robotic supracervical hysterectomy with bilateral salpingo-oophorectomy, robotic sacrocolpopexy, cystoscopy, suburethral sling on 9/20/2023 with Dr. Toth pending medical and cardiac clearance.   Patient to have medical clearance with Dr. Kraft  Patient to have medical clearance with Dr. Strong  Written and verbal instructions provided.  Patient educated on surgical scrub, preadmission instructions, clearances and day of procedure medications, verbalizes understanding.  Patient instructed to stop vitamins/supplements/herbal medications/ASA/NSAIDS for one week prior to surgery and discuss with PMD, verbalized understanding.  Patient verbalized understanding of instructions and was given the opportunity to ask questions and have them answered.  Out patient medications reviewed  and verified with patient.

## 2023-09-02 LAB
CULTURE RESULTS: SIGNIFICANT CHANGE UP
SPECIMEN SOURCE: SIGNIFICANT CHANGE UP

## 2023-09-11 ENCOUNTER — APPOINTMENT (OUTPATIENT)
Dept: UROGYNECOLOGY | Facility: CLINIC | Age: 73
End: 2023-09-11

## 2023-09-14 LAB — BACTERIA UR CULT: NORMAL

## 2023-09-19 ENCOUNTER — TRANSCRIPTION ENCOUNTER (OUTPATIENT)
Age: 73
End: 2023-09-19

## 2023-09-19 NOTE — PATIENT PROFILE ADULT - FALL HARM RISK - HARM RISK INTERVENTIONS

## 2023-09-20 ENCOUNTER — TRANSCRIPTION ENCOUNTER (OUTPATIENT)
Age: 73
End: 2023-09-20

## 2023-09-20 ENCOUNTER — RESULT REVIEW (OUTPATIENT)
Age: 73
End: 2023-09-20

## 2023-09-20 ENCOUNTER — APPOINTMENT (OUTPATIENT)
Dept: UROGYNECOLOGY | Facility: HOSPITAL | Age: 73
End: 2023-09-20

## 2023-09-20 ENCOUNTER — INPATIENT (INPATIENT)
Facility: HOSPITAL | Age: 73
LOS: 0 days | Discharge: HOME | DRG: 743 | End: 2023-09-21
Attending: OBSTETRICS & GYNECOLOGY | Admitting: OBSTETRICS & GYNECOLOGY
Payer: MEDICARE

## 2023-09-20 VITALS
OXYGEN SATURATION: 100 % | SYSTOLIC BLOOD PRESSURE: 169 MMHG | WEIGHT: 115.08 LBS | RESPIRATION RATE: 18 BRPM | HEART RATE: 68 BPM | TEMPERATURE: 98 F | DIASTOLIC BLOOD PRESSURE: 71 MMHG | HEIGHT: 63 IN

## 2023-09-20 DIAGNOSIS — Z98.890 OTHER SPECIFIED POSTPROCEDURAL STATES: Chronic | ICD-10-CM

## 2023-09-20 DIAGNOSIS — N81.9 FEMALE GENITAL PROLAPSE, UNSPECIFIED: ICD-10-CM

## 2023-09-20 DIAGNOSIS — Z90.49 ACQUIRED ABSENCE OF OTHER SPECIFIED PARTS OF DIGESTIVE TRACT: Chronic | ICD-10-CM

## 2023-09-20 LAB
APTT BLD: 34.8 SEC — SIGNIFICANT CHANGE UP (ref 24.5–35.6)
BLD GP AB SCN SERPL QL: SIGNIFICANT CHANGE UP
INR BLD: 0.94 RATIO — SIGNIFICANT CHANGE UP (ref 0.85–1.18)
PROTHROM AB SERPL-ACNC: 10.4 SEC — SIGNIFICANT CHANGE UP (ref 9.5–13)

## 2023-09-20 PROCEDURE — 58542 LSH W/T/O UT 250 G OR LESS: CPT | Mod: GC

## 2023-09-20 PROCEDURE — 57288 REPAIR BLADDER DEFECT: CPT | Mod: GC

## 2023-09-20 PROCEDURE — 88307 TISSUE EXAM BY PATHOLOGIST: CPT | Mod: 26

## 2023-09-20 PROCEDURE — 57425 LAPAROSCOPY SURG COLPOPEXY: CPT | Mod: GC

## 2023-09-20 DEVICE — SLING TRANSVAGINAL MID-URETHRAL ADVANTAGE FIT BLUE
Type: IMPLANTABLE DEVICE | Status: NON-FUNCTIONAL
Removed: 2023-09-20

## 2023-09-20 DEVICE — MESH UPSYLON Y
Type: IMPLANTABLE DEVICE | Status: NON-FUNCTIONAL
Removed: 2023-09-20

## 2023-09-20 RX ORDER — METRONIDAZOLE 500 MG
500 TABLET ORAL ONCE
Refills: 0 | Status: DISCONTINUED | OUTPATIENT
Start: 2023-09-20 | End: 2023-09-20

## 2023-09-20 RX ORDER — CELECOXIB 200 MG/1
400 CAPSULE ORAL ONCE
Refills: 0 | Status: COMPLETED | OUTPATIENT
Start: 2023-09-20 | End: 2023-09-20

## 2023-09-20 RX ORDER — MAGNESIUM CARBONATE 54 MG/5 ML
0 LIQUID (ML) ORAL
Refills: 0 | DISCHARGE

## 2023-09-20 RX ORDER — ONDANSETRON 8 MG/1
4 TABLET, FILM COATED ORAL ONCE
Refills: 0 | Status: DISCONTINUED | OUTPATIENT
Start: 2023-09-20 | End: 2023-09-20

## 2023-09-20 RX ORDER — ATORVASTATIN CALCIUM 80 MG/1
1 TABLET, FILM COATED ORAL
Refills: 0 | DISCHARGE

## 2023-09-20 RX ORDER — KETOROLAC TROMETHAMINE 30 MG/ML
30 SYRINGE (ML) INJECTION EVERY 8 HOURS
Refills: 0 | Status: DISCONTINUED | OUTPATIENT
Start: 2023-09-20 | End: 2023-09-21

## 2023-09-20 RX ORDER — SODIUM CHLORIDE 9 MG/ML
1000 INJECTION, SOLUTION INTRAVENOUS
Refills: 0 | Status: DISCONTINUED | OUTPATIENT
Start: 2023-09-20 | End: 2023-09-20

## 2023-09-20 RX ORDER — OXYCODONE HYDROCHLORIDE 5 MG/1
5 TABLET ORAL
Refills: 0 | Status: DISCONTINUED | OUTPATIENT
Start: 2023-09-20 | End: 2023-09-21

## 2023-09-20 RX ORDER — SIMETHICONE 80 MG/1
80 TABLET, CHEWABLE ORAL EVERY 6 HOURS
Refills: 0 | Status: DISCONTINUED | OUTPATIENT
Start: 2023-09-20 | End: 2023-09-21

## 2023-09-20 RX ORDER — ACETAMINOPHEN 500 MG
975 TABLET ORAL ONCE
Refills: 0 | Status: COMPLETED | OUTPATIENT
Start: 2023-09-20 | End: 2023-09-20

## 2023-09-20 RX ORDER — FENTANYL CITRATE 50 UG/ML
50 INJECTION INTRAVENOUS
Refills: 0 | Status: DISCONTINUED | OUTPATIENT
Start: 2023-09-20 | End: 2023-09-20

## 2023-09-20 RX ORDER — ENOXAPARIN SODIUM 100 MG/ML
40 INJECTION SUBCUTANEOUS EVERY 24 HOURS
Refills: 0 | Status: DISCONTINUED | OUTPATIENT
Start: 2023-09-20 | End: 2023-09-21

## 2023-09-20 RX ORDER — CEFAZOLIN SODIUM 1 G
2000 VIAL (EA) INJECTION ONCE
Refills: 0 | Status: DISCONTINUED | OUTPATIENT
Start: 2023-09-20 | End: 2023-09-20

## 2023-09-20 RX ORDER — ACETAMINOPHEN 500 MG
1000 TABLET ORAL EVERY 6 HOURS
Refills: 0 | Status: DISCONTINUED | OUTPATIENT
Start: 2023-09-20 | End: 2023-09-21

## 2023-09-20 RX ORDER — SODIUM CHLORIDE 9 MG/ML
3 INJECTION INTRAMUSCULAR; INTRAVENOUS; SUBCUTANEOUS EVERY 8 HOURS
Refills: 0 | Status: DISCONTINUED | OUTPATIENT
Start: 2023-09-20 | End: 2023-09-20

## 2023-09-20 RX ORDER — ONDANSETRON 8 MG/1
8 TABLET, FILM COATED ORAL EVERY 8 HOURS
Refills: 0 | Status: DISCONTINUED | OUTPATIENT
Start: 2023-09-20 | End: 2023-09-21

## 2023-09-20 RX ORDER — ACETAMINOPHEN, ASPIRIN 250; 250 MG/1; MG/1
0 TABLET, FILM COATED ORAL
Refills: 0 | DISCHARGE

## 2023-09-20 RX ORDER — ACETAMINOPHEN 500 MG
975 TABLET ORAL EVERY 6 HOURS
Refills: 0 | Status: DISCONTINUED | OUTPATIENT
Start: 2023-09-20 | End: 2023-09-21

## 2023-09-20 RX ADMIN — OXYCODONE HYDROCHLORIDE 5 MILLIGRAM(S): 5 TABLET ORAL at 23:51

## 2023-09-20 RX ADMIN — ENOXAPARIN SODIUM 40 MILLIGRAM(S): 100 INJECTION SUBCUTANEOUS at 18:04

## 2023-09-20 RX ADMIN — FENTANYL CITRATE 50 MICROGRAM(S): 50 INJECTION INTRAVENOUS at 12:21

## 2023-09-20 RX ADMIN — SODIUM CHLORIDE 75 MILLILITER(S): 9 INJECTION, SOLUTION INTRAVENOUS at 14:06

## 2023-09-20 RX ADMIN — CELECOXIB 400 MILLIGRAM(S): 200 CAPSULE ORAL at 06:50

## 2023-09-20 RX ADMIN — Medication 975 MILLIGRAM(S): at 23:51

## 2023-09-20 RX ADMIN — Medication 30 MILLIGRAM(S): at 21:00

## 2023-09-20 RX ADMIN — Medication 30 MILLIGRAM(S): at 14:05

## 2023-09-20 RX ADMIN — Medication 975 MILLIGRAM(S): at 06:50

## 2023-09-20 RX ADMIN — Medication 1 TABLET(S): at 17:03

## 2023-09-20 RX ADMIN — ONDANSETRON 8 MILLIGRAM(S): 8 TABLET, FILM COATED ORAL at 23:50

## 2023-09-20 RX ADMIN — Medication 1 TABLET(S): at 17:34

## 2023-09-20 RX ADMIN — Medication 30 MILLIGRAM(S): at 14:20

## 2023-09-20 NOTE — DISCHARGE NOTE PROVIDER - NSDCMRMEDTOKEN_GEN_ALL_CORE_FT
aspirin-acetaminophen buffered 250 mg-250 mg oral tablet: orally prn  atorvastatin 10 mg oral tablet: 1 orally once a day  B-Complex 50 oral tablet: 1 orally once a day  magnesium carbonate 250 mg oral capsule: orally once a day  Multiple Vitamins oral tablet: 1 orally once a day   aspirin-acetaminophen buffered 250 mg-250 mg oral tablet: orally prn  atorvastatin 10 mg oral tablet: 1 orally once a day  B-Complex 50 oral tablet: 1 orally once a day  magnesium carbonate 250 mg oral capsule: orally once a day  Multiple Vitamins oral tablet: 1 orally once a day  simethicone 80 mg oral tablet, chewable: 1 tab(s) orally every 6 hours As needed Gas

## 2023-09-20 NOTE — DISCHARGE NOTE PROVIDER - NSDCCPTREATMENT_GEN_ALL_CORE_FT
PRINCIPAL PROCEDURE  Procedure: Robot-assisted laparoscopic supracervical hysterectomy using da Mayra Si with cystoscopy  Findings and Treatment:       SECONDARY PROCEDURE  Procedure: Placement, suburethral sling, using polypropylene mesh  Findings and Treatment:     Procedure: Cystoscopy  Findings and Treatment:     Procedure: Robot-assisted sacrocolpopexy  Findings and Treatment:

## 2023-09-20 NOTE — PROGRESS NOTE ADULT - SUBJECTIVE AND OBJECTIVE BOX
KIM REESE is a 72y now POD#0 s/p RA-MCKAYLA, BS, sacrocolpopexy, suburethral sling, and cystoscopy.    S:    Patient was seen and examined at bedside.   Patient reports she has a mild headache starting.  Pain is well controlled with current treatment regimen.   Tolerating sips of water and ginger ale, denies N/V.   Patient is not ambulating yet. Will try to ambulate tonight.   -flatus/-BM  She denies lightheadedness, dizziness, palpitations, chest pain and SOB.     O:   T(C): 36.6 (09-20-23 @ 16:11), Max: 36.7 (09-20-23 @ 14:10)  HR: 84 (09-20-23 @ 16:11) (60 - 84)  BP: 148/72 (09-20-23 @ 16:11) (131/63 - 169/71)  RR: 17 (09-20-23 @ 16:11) (15 - 20)  SpO2: 97% (09-20-23 @ 16:11) (96% - 100%)    Gen: NAD, AAOx3  CV: RRR, S1/S2 present  Pulm: CTAB  Abdomen: Soft, nondistended, appropriately tender, + BS   Pelvic: Pad inspected with minimal amount of dark red blood   Incision: 5 abdominal incisions clean, dry and intact with dermabond  Extremities: No calf tenderness or edema   Landa in place with clear yellow urine    Labs:       09-20-23 @ 07:01  -  09-20-23 @ 16:43  --------------------------------------------------------  IN: 225 mL / OUT: 200 mL / NET: 25 mL

## 2023-09-20 NOTE — DISCHARGE NOTE PROVIDER - NSDCCPCAREPLAN_GEN_ALL_CORE_FT
PRINCIPAL DISCHARGE DIAGNOSIS  Diagnosis: Female genital prolapse, unspecified  Assessment and Plan of Treatment:

## 2023-09-20 NOTE — DISCHARGE NOTE PROVIDER - NSDCFUSCHEDAPPT_GEN_ALL_CORE_FT
[facility]  UROGYN [address]  Scheduled Appointment: 10/05/2023    Wesley Toth  [facility]  UROGYN [address]  Scheduled Appointment: 11/09/2023

## 2023-09-20 NOTE — DISCHARGE NOTE PROVIDER - NSDCFUADDINST_GEN_ALL_CORE_FT
May walk and climb stairs as often as you would like, no vigorous activity, do not lift anything greater than 10lbs, nothing per vagina x 6 weeks, do not drive while on pain medication.

## 2023-09-20 NOTE — BRIEF OPERATIVE NOTE - OPERATION/FINDINGS
Normal external female genitatila. Normal uterus and partial tubes and ovaries. Supracervical hysterectomy performed in usual fashion without difficulty. Sacrocolpopexy performed in usual fashion with Upsylon Y mesh. Suburethral sling performed in suprapubic approach using Advantage care fit blue. Cystoscopy with normal appearing urothelium throughout and + jets form UOs bilaterally.

## 2023-09-20 NOTE — BRIEF OPERATIVE NOTE - NSICDXBRIEFPOSTOP_GEN_ALL_CORE_FT
POST-OP DIAGNOSIS:  Urinary, incontinence, stress female 20-Sep-2023 11:33:12  America Beyer  Prolapse of female pelvic organs 20-Sep-2023 11:32:55  America Beyer

## 2023-09-20 NOTE — BRIEF OPERATIVE NOTE - NSICDXBRIEFPROCEDURE_GEN_ALL_CORE_FT
PROCEDURES:  Robot-assisted sacrocolpopexy 20-Sep-2023 11:30:23  America Beyer  Placement, suburethral sling, using polypropylene mesh 20-Sep-2023 11:31:25  America Beyer  Robot-assisted laparoscopic supracervical hysterectomy using da Mayra Si with cystoscopy 20-Sep-2023 11:31:46  America Beyer  Cystoscopy 20-Sep-2023 11:31:51  America Beyer  Cystoscopy 20-Sep-2023 11:31:56  America Beyer

## 2023-09-20 NOTE — DISCHARGE NOTE PROVIDER - HOSPITAL COURSE
72y s/p RA-MCKAYLA, BS, sacrocolpopexy, suburethral sling, and cystoscopy. Patient post-operatively had an uncomplicated hospital course. Her pain was well controlled. She is tolerating a regular diet. She is ambulating independently. She was able to void after removal of segura. Labs and Vitals WNL upon discharge.     72y s/p RA-MCKAYLA, BS, sacrocolpopexy, suburethral sling, and cystoscopy. Patient post-operatively had an uncomplicated hospital course. Her pain was well controlled. She is tolerating a regular diet. She is ambulating independently. Trial of void passed Labs and Vitals WNL upon discharge.

## 2023-09-20 NOTE — DISCHARGE NOTE PROVIDER - CARE PROVIDER_API CALL
Wesley Toth  Urogynecology  [address] [place] [id number]  Phone: [phone]  Fax: [phone]  Established Patient  Follow Up Time: 2 weeks

## 2023-09-20 NOTE — BRIEF OPERATIVE NOTE - NSICDXBRIEFPREOP_GEN_ALL_CORE_FT
PRE-OP DIAGNOSIS:  Prolapse of female pelvic organs 20-Sep-2023 11:32:11  America Beyer  Urinary, incontinence, stress female 20-Sep-2023 11:32:44  America Beyer

## 2023-09-20 NOTE — PROGRESS NOTE ADULT - ASSESSMENT
KIM REESE is a 72y now POD#0 s/p RA-MCKAYLA, BS, sacrocolpopexy, suburethral sling, and cystoscopy.    A/P:   Neuro: Post -op pain well controlled. Continue current pain regimen. Will order fiorecet for headache.  CV: No history of cardiovascular disease. Blood pressure well controlled.  Pulm: No active disease. Saturating well on room air. Incentive spirometer use encouraged  GI: No active disease. Bowel sounds and function normal, tolerating liquids. Continue current bowel regimen.   : Landa in place. to be removed in morning followed by TOV.  Heme: Hgb 14.0 -> AM labs pending  ID: Afebrile. No antibiotics indicated at this time.   FEN: IVF at 75ml/hr. Will discontinue IVF tomorrow. Electrolytes WNL. AM labs pending.   DVT ppx: Ambulation encouraged, SCDs when in bed, lovenox ordered.  Dispo: Continue to monitor post-operative progress..

## 2023-09-21 ENCOUNTER — TRANSCRIPTION ENCOUNTER (OUTPATIENT)
Age: 73
End: 2023-09-21

## 2023-09-21 VITALS
SYSTOLIC BLOOD PRESSURE: 124 MMHG | TEMPERATURE: 98 F | OXYGEN SATURATION: 99 % | DIASTOLIC BLOOD PRESSURE: 69 MMHG | RESPIRATION RATE: 18 BRPM | HEART RATE: 80 BPM

## 2023-09-21 LAB
ALBUMIN SERPL ELPH-MCNC: 3.6 G/DL — SIGNIFICANT CHANGE UP (ref 3.3–5.2)
ALP SERPL-CCNC: 50 U/L — SIGNIFICANT CHANGE UP (ref 40–120)
ALT FLD-CCNC: 21 U/L — SIGNIFICANT CHANGE UP
ANION GAP SERPL CALC-SCNC: 10 MMOL/L — SIGNIFICANT CHANGE UP (ref 5–17)
AST SERPL-CCNC: 30 U/L — SIGNIFICANT CHANGE UP
BILIRUB SERPL-MCNC: 0.6 MG/DL — SIGNIFICANT CHANGE UP (ref 0.4–2)
BUN SERPL-MCNC: 8.6 MG/DL — SIGNIFICANT CHANGE UP (ref 8–20)
CALCIUM SERPL-MCNC: 8.7 MG/DL — SIGNIFICANT CHANGE UP (ref 8.4–10.5)
CHLORIDE SERPL-SCNC: 107 MMOL/L — SIGNIFICANT CHANGE UP (ref 96–108)
CO2 SERPL-SCNC: 24 MMOL/L — SIGNIFICANT CHANGE UP (ref 22–29)
CREAT SERPL-MCNC: 0.68 MG/DL — SIGNIFICANT CHANGE UP (ref 0.5–1.3)
EGFR: 93 ML/MIN/1.73M2 — SIGNIFICANT CHANGE UP
GLUCOSE SERPL-MCNC: 94 MG/DL — SIGNIFICANT CHANGE UP (ref 70–99)
HCT VFR BLD CALC: 35.3 % — SIGNIFICANT CHANGE UP (ref 34.5–45)
HGB BLD-MCNC: 11.6 G/DL — SIGNIFICANT CHANGE UP (ref 11.5–15.5)
MCHC RBC-ENTMCNC: 29.9 PG — SIGNIFICANT CHANGE UP (ref 27–34)
MCHC RBC-ENTMCNC: 32.9 GM/DL — SIGNIFICANT CHANGE UP (ref 32–36)
MCV RBC AUTO: 91 FL — SIGNIFICANT CHANGE UP (ref 80–100)
PLATELET # BLD AUTO: 221 K/UL — SIGNIFICANT CHANGE UP (ref 150–400)
POTASSIUM SERPL-MCNC: 3.9 MMOL/L — SIGNIFICANT CHANGE UP (ref 3.5–5.3)
POTASSIUM SERPL-SCNC: 3.9 MMOL/L — SIGNIFICANT CHANGE UP (ref 3.5–5.3)
PROT SERPL-MCNC: 5.8 G/DL — LOW (ref 6.6–8.7)
RBC # BLD: 3.88 M/UL — SIGNIFICANT CHANGE UP (ref 3.8–5.2)
RBC # FLD: 13.1 % — SIGNIFICANT CHANGE UP (ref 10.3–14.5)
SODIUM SERPL-SCNC: 141 MMOL/L — SIGNIFICANT CHANGE UP (ref 135–145)
WBC # BLD: 8.96 K/UL — SIGNIFICANT CHANGE UP (ref 3.8–10.5)
WBC # FLD AUTO: 8.96 K/UL — SIGNIFICANT CHANGE UP (ref 3.8–10.5)

## 2023-09-21 PROCEDURE — 86850 RBC ANTIBODY SCREEN: CPT

## 2023-09-21 PROCEDURE — 57240 ANTERIOR COLPORRHAPHY: CPT

## 2023-09-21 PROCEDURE — 86900 BLOOD TYPING SEROLOGIC ABO: CPT

## 2023-09-21 PROCEDURE — 88307 TISSUE EXAM BY PATHOLOGIST: CPT

## 2023-09-21 PROCEDURE — 58542 LSH W/T/O UT 250 G OR LESS: CPT

## 2023-09-21 PROCEDURE — 85610 PROTHROMBIN TIME: CPT

## 2023-09-21 PROCEDURE — C1781: CPT

## 2023-09-21 PROCEDURE — 57280 SUSPENSION OF VAGINA: CPT

## 2023-09-21 PROCEDURE — S2900: CPT

## 2023-09-21 PROCEDURE — C1771: CPT

## 2023-09-21 PROCEDURE — 80053 COMPREHEN METABOLIC PANEL: CPT

## 2023-09-21 PROCEDURE — 36415 COLL VENOUS BLD VENIPUNCTURE: CPT

## 2023-09-21 PROCEDURE — 52351 CYSTOURETERO & OR PYELOSCOPE: CPT

## 2023-09-21 PROCEDURE — 85730 THROMBOPLASTIN TIME PARTIAL: CPT

## 2023-09-21 PROCEDURE — 85027 COMPLETE CBC AUTOMATED: CPT

## 2023-09-21 PROCEDURE — C9399: CPT

## 2023-09-21 PROCEDURE — 57267 INSERT MESH/PELVIC FLR ADDON: CPT

## 2023-09-21 PROCEDURE — 86901 BLOOD TYPING SEROLOGIC RH(D): CPT

## 2023-09-21 RX ORDER — SIMETHICONE 80 MG/1
1 TABLET, CHEWABLE ORAL
Qty: 0 | Refills: 0 | DISCHARGE
Start: 2023-09-21

## 2023-09-21 RX ADMIN — Medication 30 MILLIGRAM(S): at 05:02

## 2023-09-21 RX ADMIN — Medication 975 MILLIGRAM(S): at 00:21

## 2023-09-21 RX ADMIN — OXYCODONE HYDROCHLORIDE 5 MILLIGRAM(S): 5 TABLET ORAL at 00:01

## 2023-09-21 RX ADMIN — Medication 975 MILLIGRAM(S): at 05:02

## 2023-09-21 RX ADMIN — Medication 30 MILLIGRAM(S): at 06:00

## 2023-09-21 RX ADMIN — Medication 975 MILLIGRAM(S): at 06:00

## 2023-09-21 NOTE — PROGRESS NOTE ADULT - REASON FOR ADMISSION
S/p Robotic supracervical hysterectomy, sacrocolpopexy, suprapubic MUS placement, and cystoscopy S/p Robotic supracervical hysterectomy, BS, sacrocolpopexy, RP MUS placement, and cystoscopy

## 2023-09-21 NOTE — CHART NOTE - NSCHARTNOTEFT_GEN_A_CORE
PA  73y/o female POD#1   pt seen today alert oriented tolerated po, vs stable ambulated, H/H stable,  passed trial of void 300 ml in voided 350 ml .   discharge instructions provided pt understands and agree with plan   pt to follow up in the urogyn office in two weeks.

## 2023-09-21 NOTE — DISCHARGE NOTE NURSING/CASE MANAGEMENT/SOCIAL WORK - NSDCPEFALRISK_GEN_ALL_CORE
For information on Fall & Injury Prevention, visit: [email or web address] OR  [email or web address] OR  https://www.cdc.gov/steadi/patient.html

## 2023-09-21 NOTE — PROGRESS NOTE ADULT - ATTENDING COMMENTS
Patient seen and examined. Agree with above. Normal bruising. Pending active TOV. Discharge home today after TOV.

## 2023-09-21 NOTE — PROGRESS NOTE ADULT - SUBJECTIVE AND OBJECTIVE BOX
Pt seen and examined at bedside no acute events overnight.   She reports to be doing well. Pain controlled with pain medications. Endorses no flatus yet. Patient able to tolerate PO. Patient reports...  Patient walked to the bathroom ...  She denies chest pain, sob, N/V, severe abdominal pain, and b/l calf tenderness.        PE  ICU Vital Signs Last 24 Hrs  T(C): 36.8 (21 Sep 2023 04:49), Max: 36.8 (21 Sep 2023 04:49)  T(F): 98.2 (21 Sep 2023 04:49), Max: 98.2 (21 Sep 2023 04:49)  HR: 65 (21 Sep 2023 04:49) (60 - 84)  BP: 145/72 (21 Sep 2023 04:49) (126/70 - 169/71)  BP(mean): 91 (20 Sep 2023 13:20) (77 - 94)  RR: 18 (21 Sep 2023 04:49) (15 - 20)  SpO2: 97% (21 Sep 2023 04:49) (96% - 100%)  O2 Parameters below as of 21 Sep 2023 04:49  Patient On (Oxygen Delivery Method): room air                            11.6   8.96  )-----------( 221      ( 21 Sep 2023 04:40 )             35.3   09-21    141  |  107  |  8.6  ----------------------------<  94  3.9   |  24.0  |  0.68    Ca    8.7      21 Sep 2023 04:40    TPro  5.8<L>  /  Alb  3.6  /  TBili  0.6  /  DBili  x   /  AST  30  /  ALT  21  /  AlkPhos  50  09-21      General: NAD, AAOx3  Abd: soft, NT, ND,  5 robotic port site incision clean, dry and intact,   Ext: no calf tenderness  :  sling sites located at mons C/D/I, Landa removed  LE: NT, no edema noted, SCDs removed      POD#1 s/p   Robotic Supracervical hysterectomy, sacrocolpopexy, suprapubic MUS placement, and cystoscopy for POP and BRENDA    -Pt clinically, vitally and hemodynamically stable  -Am labs stable, H/H *  -Passed active TOV *  -Meeting   milestones: Tolerating PO, OOB, walking  -Pain management continue tylenol and motrin as ordered.   -Post op instructions reviewed - patient verbalized understanding  -Patient for discharge      Discussed with KATHIN attending***  America Beyer DO Pt seen and examined at bedside no acute events overnight.   She reports to be doing well. Pain controlled with pain medications. Endorses no flatus yet. Patient able to tolerate PO. Patient reports...  Patient walked to the bathroom.  She denies chest pain, sob, N/V, severe abdominal pain, and b/l calf tenderness.        PE  ICU Vital Signs Last 24 Hrs  T(C): 36.8 (21 Sep 2023 04:49), Max: 36.8 (21 Sep 2023 04:49)  T(F): 98.2 (21 Sep 2023 04:49), Max: 98.2 (21 Sep 2023 04:49)  HR: 65 (21 Sep 2023 04:49) (60 - 84)  BP: 145/72 (21 Sep 2023 04:49) (126/70 - 169/71)  BP(mean): 91 (20 Sep 2023 13:20) (77 - 94)  RR: 18 (21 Sep 2023 04:49) (15 - 20)  SpO2: 97% (21 Sep 2023 04:49) (96% - 100%)  O2 Parameters below as of 21 Sep 2023 04:49  Patient On (Oxygen Delivery Method): room air                            11.6   8.96  )-----------( 221      ( 21 Sep 2023 04:40 )             35.3   09-21    141  |  107  |  8.6  ----------------------------<  94  3.9   |  24.0  |  0.68    Ca    8.7      21 Sep 2023 04:40    TPro  5.8<L>  /  Alb  3.6  /  TBili  0.6  /  DBili  x   /  AST  30  /  ALT  21  /  AlkPhos  50  09-21      General: NAD, AAOx3  Abd: soft, NT, ND,  5 robotic port site incision clean, dry and intact. Ecchymosis  Ext: no calf tenderness  :  sling sites located at mons +ve bruising  VE: No active bleeding  LE: NT, no edema noted, SCDs removed      POD#1 s/p   Robotic Supracervical hysterectomy, BS, sacrocolpopexy, RP MUS placement, and cystoscopy for POP and BRENDA    -Pt clinically, vitally and hemodynamically stable  -Am labs stable, H/H *  -Passed active TOV *  -Meeting   milestones: Tolerating PO, OOB, walking  -Pain management continue tylenol and motrin as ordered.   -Post op instructions reviewed - patient verbalized understanding  -Patient for discharge      Discussed with GREGORIO attending***  America Beyer DO Pt seen and examined at bedside no acute events overnight.   She reports to be doing well. Pain controlled with pain medications. Endorses no flatus yet. Patient able to tolerate PO.   She denies chest pain, sob, N/V, severe abdominal pain, and b/l calf tenderness.        PE  ICU Vital Signs Last 24 Hrs  T(C): 36.8 (21 Sep 2023 04:49), Max: 36.8 (21 Sep 2023 04:49)  T(F): 98.2 (21 Sep 2023 04:49), Max: 98.2 (21 Sep 2023 04:49)  HR: 65 (21 Sep 2023 04:49) (60 - 84)  BP: 145/72 (21 Sep 2023 04:49) (126/70 - 169/71)  BP(mean): 91 (20 Sep 2023 13:20) (77 - 94)  RR: 18 (21 Sep 2023 04:49) (15 - 20)  SpO2: 97% (21 Sep 2023 04:49) (96% - 100%)  O2 Parameters below as of 21 Sep 2023 04:49  Patient On (Oxygen Delivery Method): room air                            11.6   8.96  )-----------( 221      ( 21 Sep 2023 04:40 )             35.3   09-21    141  |  107  |  8.6  ----------------------------<  94  3.9   |  24.0  |  0.68    Ca    8.7      21 Sep 2023 04:40    TPro  5.8<L>  /  Alb  3.6  /  TBili  0.6  /  DBili  x   /  AST  30  /  ALT  21  /  AlkPhos  50  09-21      General: NAD, AAOx3  Abd: soft, NT, ND,  5 robotic port site incision clean, dry and intact. Ecchymosis  Ext: no calf tenderness  :  sling sites located at mons +ve bruising  VE: No active bleeding  LE: NT, no edema noted, SCDs removed      POD#1 s/p   Robotic Supracervical hysterectomy, BS, sacrocolpopexy, RP MUS placement, and cystoscopy for POP and BRENDA    -Pt clinically, vitally and hemodynamically stable  -Am labs stable, H/H *  -Passed active TOV *  -Meeting   milestones: Tolerating PO, OOB, walking  -Pain management continue tylenol and motrin as ordered.   -Post op instructions reviewed - patient verbalized understanding  -Patient for discharge      Discussed with KATHIN attending***  America Beyer DO

## 2023-09-21 NOTE — DISCHARGE NOTE NURSING/CASE MANAGEMENT/SOCIAL WORK - PATIENT PORTAL LINK FT
You can access the FollowMyHealth Patient Portal offered by [facility] by registering at the following website: [email or web address]. By joining [facility]’s FollowMyHealth portal, you will also be able to view your health information using other applications (apps) compatible with our system.

## 2023-09-28 LAB — SURGICAL PATHOLOGY STUDY: SIGNIFICANT CHANGE UP

## 2023-10-05 ENCOUNTER — APPOINTMENT (OUTPATIENT)
Dept: UROGYNECOLOGY | Facility: CLINIC | Age: 73
End: 2023-10-05
Payer: MEDICARE

## 2023-10-05 VITALS
SYSTOLIC BLOOD PRESSURE: 167 MMHG | HEIGHT: 63 IN | HEART RATE: 69 BPM | BODY MASS INDEX: 21.62 KG/M2 | WEIGHT: 122 LBS | DIASTOLIC BLOOD PRESSURE: 81 MMHG | OXYGEN SATURATION: 100 %

## 2023-10-05 LAB
BILIRUB UR QL STRIP: NEGATIVE
CLARITY UR: CLEAR
COLLECTION METHOD: NORMAL
GLUCOSE UR-MCNC: NEGATIVE
HCG UR QL: 0.2 EU/DL
HGB UR QL STRIP.AUTO: NORMAL
KETONES UR-MCNC: NEGATIVE
LEUKOCYTE ESTERASE UR QL STRIP: NORMAL
NITRITE UR QL STRIP: NEGATIVE
PH UR STRIP: 7.5
PROT UR STRIP-MCNC: NEGATIVE
SP GR UR STRIP: 1.01

## 2023-10-05 PROCEDURE — 99024 POSTOP FOLLOW-UP VISIT: CPT

## 2023-10-16 PROBLEM — G43.909 MIGRAINE, UNSPECIFIED, NOT INTRACTABLE, WITHOUT STATUS MIGRAINOSUS: Chronic | Status: ACTIVE | Noted: 2023-08-31

## 2023-10-16 PROBLEM — E78.00 PURE HYPERCHOLESTEROLEMIA, UNSPECIFIED: Chronic | Status: ACTIVE | Noted: 2023-08-31

## 2023-10-23 ENCOUNTER — APPOINTMENT (OUTPATIENT)
Dept: OPHTHALMOLOGY | Facility: CLINIC | Age: 73
End: 2023-10-23
Payer: MEDICARE

## 2023-10-23 ENCOUNTER — NON-APPOINTMENT (OUTPATIENT)
Age: 73
End: 2023-10-23

## 2023-10-23 PROCEDURE — 92012 INTRM OPH EXAM EST PATIENT: CPT | Mod: 25

## 2023-10-23 PROCEDURE — 95060 OPH MUCOUS MEMBRANE TESTS: CPT

## 2023-10-23 PROCEDURE — 92020 GONIOSCOPY: CPT

## 2023-10-23 PROCEDURE — 68761 CLOSE TEAR DUCT OPENING: CPT | Mod: E2

## 2023-11-09 ENCOUNTER — APPOINTMENT (OUTPATIENT)
Dept: UROGYNECOLOGY | Facility: CLINIC | Age: 73
End: 2023-11-09
Payer: MEDICARE

## 2023-11-09 VITALS
BODY MASS INDEX: 21.62 KG/M2 | WEIGHT: 122 LBS | DIASTOLIC BLOOD PRESSURE: 84 MMHG | SYSTOLIC BLOOD PRESSURE: 137 MMHG | HEIGHT: 63 IN

## 2023-11-09 DIAGNOSIS — Z09 ENCOUNTER FOR FOLLOW-UP EXAMINATION AFTER COMPLETED TREATMENT FOR CONDITIONS OTHER THAN MALIGNANT NEOPLASM: ICD-10-CM

## 2023-11-09 LAB
BILIRUB UR QL STRIP: NEGATIVE
CLARITY UR: CLEAR
COLLECTION METHOD: NORMAL
GLUCOSE UR-MCNC: NEGATIVE
HCG UR QL: 0.2 EU/DL
HGB UR QL STRIP.AUTO: NEGATIVE
KETONES UR-MCNC: NEGATIVE
LEUKOCYTE ESTERASE UR QL STRIP: NEGATIVE
NITRITE UR QL STRIP: NEGATIVE
PH UR STRIP: 6
PROT UR STRIP-MCNC: NEGATIVE
SP GR UR STRIP: 1.02

## 2023-11-09 PROCEDURE — 81003 URINALYSIS AUTO W/O SCOPE: CPT | Mod: QW

## 2023-11-09 PROCEDURE — 99024 POSTOP FOLLOW-UP VISIT: CPT

## 2023-11-09 PROCEDURE — 51798 US URINE CAPACITY MEASURE: CPT

## 2023-11-15 ENCOUNTER — NON-APPOINTMENT (OUTPATIENT)
Age: 73
End: 2023-11-15

## 2023-11-15 ENCOUNTER — APPOINTMENT (OUTPATIENT)
Dept: OPHTHALMOLOGY | Facility: CLINIC | Age: 73
End: 2023-11-15
Payer: MEDICARE

## 2023-11-15 PROCEDURE — 92020 GONIOSCOPY: CPT

## 2023-11-15 PROCEDURE — 92012 INTRM OPH EXAM EST PATIENT: CPT

## 2024-01-05 ENCOUNTER — APPOINTMENT (OUTPATIENT)
Dept: OPHTHALMOLOGY | Facility: CLINIC | Age: 74
End: 2024-01-05

## 2024-02-20 ENCOUNTER — APPOINTMENT (OUTPATIENT)
Dept: VASCULAR SURGERY | Facility: CLINIC | Age: 74
End: 2024-02-20
Payer: MEDICARE

## 2024-02-20 DIAGNOSIS — L81.9 DISORDER OF PIGMENTATION, UNSPECIFIED: ICD-10-CM

## 2024-02-20 PROCEDURE — 99204 OFFICE O/P NEW MOD 45 MIN: CPT

## 2024-02-20 PROCEDURE — 93922 UPR/L XTREMITY ART 2 LEVELS: CPT

## 2024-02-20 NOTE — PLAN
[TextEntry] : no evidence of arterial insufficiency.  Recommended FU with podiatry/rheum.  No vascular intervention indicated.

## 2024-02-20 NOTE — PHYSICAL EXAM
[TextEntry] : CONSTITUTIONAL:  Normal appearing and well developed; No distress       NEURO:   AAOx3; Sensory/Motor Intact       RESPIRATORY: non-labored respirations CV: regular rate     SKIN:    - edema   -  Rash  - Pigmentation     - Ulcer        If yes: Location: (_) Right   (_) Left                   Description: _       mild hyperemia in area of right bunion  no evidence of cellulitis  MUSCULOSKELETAL:               (+) Ankle ROM intact             VASCULAR:         Superficial phlebitis?               [] Yes                (+) No               PULSE EXAM: + distal pulses

## 2024-02-20 NOTE — HISTORY OF PRESENT ILLNESS
[FreeTextEntry1] : 74yo F with hx of HLD presents for consultation with concerns of intermittent redness to her right first toe x 1.5months.  She has bilateral bunions without pain and follows podiatry (Sophia Redmond).  She notices increased redness especially after a hot shower localized over the right bunion.  Denies claudication or rest pain.  She is also undergoing briseno by rheumatology for elevated rheumatoid factor.

## 2024-03-05 ENCOUNTER — APPOINTMENT (OUTPATIENT)
Dept: OPHTHALMOLOGY | Facility: CLINIC | Age: 74
End: 2024-03-05
Payer: MEDICARE

## 2024-03-05 ENCOUNTER — NON-APPOINTMENT (OUTPATIENT)
Age: 74
End: 2024-03-05

## 2024-03-05 PROCEDURE — 92012 INTRM OPH EXAM EST PATIENT: CPT

## 2024-04-15 ENCOUNTER — APPOINTMENT (OUTPATIENT)
Dept: UROGYNECOLOGY | Facility: CLINIC | Age: 74
End: 2024-04-15
Payer: MEDICARE

## 2024-04-15 VITALS
HEIGHT: 63 IN | SYSTOLIC BLOOD PRESSURE: 145 MMHG | DIASTOLIC BLOOD PRESSURE: 74 MMHG | WEIGHT: 120 LBS | BODY MASS INDEX: 21.26 KG/M2

## 2024-04-15 DIAGNOSIS — N81.9 FEMALE GENITAL PROLAPSE, UNSPECIFIED: ICD-10-CM

## 2024-04-15 LAB
BILIRUB UR QL STRIP: NEGATIVE
CLARITY UR: CLEAR
COLLECTION METHOD: NORMAL
GLUCOSE UR-MCNC: NEGATIVE
HCG UR QL: 0.2 EU/DL
HGB UR QL STRIP.AUTO: NEGATIVE
KETONES UR-MCNC: NEGATIVE
LEUKOCYTE ESTERASE UR QL STRIP: NEGATIVE
NITRITE UR QL STRIP: NEGATIVE
PH UR STRIP: 7
PROT UR STRIP-MCNC: NORMAL
SP GR UR STRIP: 1.02

## 2024-04-15 PROCEDURE — 51798 US URINE CAPACITY MEASURE: CPT

## 2024-04-15 PROCEDURE — 81003 URINALYSIS AUTO W/O SCOPE: CPT | Mod: QW

## 2024-04-15 PROCEDURE — 99213 OFFICE O/P EST LOW 20 MIN: CPT

## 2024-04-15 NOTE — HISTORY OF PRESENT ILLNESS
[FreeTextEntry1] : Valentine had a robotic MCKAYLA BS SCP RP sling September 2023.  She presents today for follow-up. She is still getting up 2-3 times at night. She is taking miralax at night with 6 oz of water. She is not leaking any urine. By midday her urinary frequency is significantly reduced. She is having some swelling in her right big toe. She saw rheumatologist who suggested she may be developing an autoimmune disease. She is not having pain in her toe. Moving her bowels well now with the miralax.

## 2024-04-15 NOTE — DISCUSSION/SUMMARY
[FreeTextEntry1] : Doing well. Followup in 6 months. We discussed changing the timing of miralax and fluid restriction after 7 pm.

## 2024-04-15 NOTE — PHYSICAL EXAM
[Chaperone Present] : A chaperone was present in the examining room during all aspects of the physical examination [96812] : A chaperone was present during the pelvic exam. [Scar] : a scar was noted [Normal Appearance] : general appearance was normal [Aa ____] : Aa [unfilled] [Ba ____] : Ba [unfilled] [C ____] : C [unfilled] [GH ____] : GH [unfilled] [PB ____] : PB [unfilled] [TVL ____] : TVL  [unfilled] [Ap ____] : Ap [unfilled] [Bp ____] : Bp [unfilled] [D ____] : D [unfilled] [Normal] : no abnormalities [FreeTextEntry2] : Krystle [Hernia] : no hernia observed [FreeTextEntry4] : no exposure, graft nontender

## 2024-07-05 ENCOUNTER — NON-APPOINTMENT (OUTPATIENT)
Age: 74
End: 2024-07-05

## 2024-07-05 ENCOUNTER — APPOINTMENT (OUTPATIENT)
Dept: OPHTHALMOLOGY | Facility: CLINIC | Age: 74
End: 2024-07-05
Payer: MEDICARE

## 2024-07-05 PROCEDURE — 92133 CPTRZD OPH DX IMG PST SGM ON: CPT

## 2024-07-05 PROCEDURE — 92014 COMPRE OPH EXAM EST PT 1/>: CPT

## 2024-09-09 ENCOUNTER — NON-APPOINTMENT (OUTPATIENT)
Age: 74
End: 2024-09-09

## 2024-09-09 ENCOUNTER — APPOINTMENT (OUTPATIENT)
Dept: OPHTHALMOLOGY | Facility: CLINIC | Age: 74
End: 2024-09-09
Payer: MEDICARE

## 2024-09-09 PROCEDURE — 92012 INTRM OPH EXAM EST PATIENT: CPT

## 2024-11-18 ENCOUNTER — APPOINTMENT (OUTPATIENT)
Dept: OPHTHALMOLOGY | Facility: CLINIC | Age: 74
End: 2024-11-18
Payer: MEDICARE

## 2024-11-18 ENCOUNTER — NON-APPOINTMENT (OUTPATIENT)
Age: 74
End: 2024-11-18

## 2024-11-18 PROCEDURE — 92014 COMPRE OPH EXAM EST PT 1/>: CPT

## 2024-12-09 ENCOUNTER — APPOINTMENT (OUTPATIENT)
Dept: UROGYNECOLOGY | Facility: CLINIC | Age: 74
End: 2024-12-09

## 2024-12-09 VITALS
HEIGHT: 63 IN | DIASTOLIC BLOOD PRESSURE: 94 MMHG | WEIGHT: 119 LBS | BODY MASS INDEX: 21.09 KG/M2 | SYSTOLIC BLOOD PRESSURE: 180 MMHG

## 2024-12-09 DIAGNOSIS — N81.9 FEMALE GENITAL PROLAPSE, UNSPECIFIED: ICD-10-CM

## 2024-12-09 LAB
BILIRUB UR QL STRIP: NEGATIVE
CLARITY UR: CLEAR
COLLECTION METHOD: NORMAL
GLUCOSE UR-MCNC: NEGATIVE
HCG UR QL: 0.2 EU/DL
HGB UR QL STRIP.AUTO: NEGATIVE
KETONES UR-MCNC: NEGATIVE
LEUKOCYTE ESTERASE UR QL STRIP: NEGATIVE
NITRITE UR QL STRIP: NEGATIVE
PH UR STRIP: 7
PROT UR STRIP-MCNC: NEGATIVE
SP GR UR STRIP: 1.01

## 2024-12-09 PROCEDURE — 99213 OFFICE O/P EST LOW 20 MIN: CPT

## 2024-12-09 PROCEDURE — 51798 US URINE CAPACITY MEASURE: CPT

## 2024-12-09 PROCEDURE — 99459 PELVIC EXAMINATION: CPT

## 2024-12-09 PROCEDURE — 81003 URINALYSIS AUTO W/O SCOPE: CPT | Mod: QW

## 2024-12-11 RX ORDER — VIBEGRON 75 MG/1
75 TABLET, FILM COATED ORAL
Qty: 30 | Refills: 1 | Status: ACTIVE | COMMUNITY
Start: 2024-12-11 | End: 1900-01-01

## 2025-01-03 ENCOUNTER — APPOINTMENT (OUTPATIENT)
Dept: UROGYNECOLOGY | Facility: CLINIC | Age: 75
End: 2025-01-03

## 2025-01-07 ENCOUNTER — APPOINTMENT (OUTPATIENT)
Dept: OPHTHALMOLOGY | Facility: CLINIC | Age: 75
End: 2025-01-07

## 2025-01-07 PROCEDURE — 92136 OPHTHALMIC BIOMETRY: CPT

## 2025-01-07 PROCEDURE — 92012 INTRM OPH EXAM EST PATIENT: CPT

## 2025-01-27 ENCOUNTER — APPOINTMENT (OUTPATIENT)
Dept: OPHTHALMOLOGY | Facility: AMBULATORY MEDICAL SERVICES | Age: 75
End: 2025-01-27

## 2025-01-27 PROCEDURE — 66984 XCAPSL CTRC RMVL W/O ECP: CPT | Mod: LT

## 2025-01-28 ENCOUNTER — APPOINTMENT (OUTPATIENT)
Dept: OPHTHALMOLOGY | Facility: CLINIC | Age: 75
End: 2025-01-28
Payer: MEDICARE

## 2025-01-28 ENCOUNTER — NON-APPOINTMENT (OUTPATIENT)
Age: 75
End: 2025-01-28

## 2025-01-28 PROCEDURE — 99024 POSTOP FOLLOW-UP VISIT: CPT

## 2025-01-28 NOTE — ED ADULT NURSE NOTE - CAS EDN INTEG ASSESS
WDL
89 YOF with PMH of HTN, HLD presenting with one day of fevers and weakness admitted for severe sepsis of unclear etiology.     Vitals, labs, imaging reviewed. Patient seen this morning resting in bed - ROS negative (but feels not back to her normal self). Exam non-revealing, no pertinent abnormalities, non-toxic appearing.      Severe sepsis - BP drop in ED noted, ?outlier, has been hypertensive otherwise with normal lactate on admission. Unclear source, no localizing symptoms, workup non-reveling thus far. Continue broad spectrums pending blood cultures, CXR with ?prominent perihilar region, obtain CT chest, inflammatory markers.    Acute on chronic hyponatremia - improved with isotonic fluids, trend daily.     GOC above.     Dispo - pending infectious workup, PT.

## 2025-02-04 ENCOUNTER — APPOINTMENT (OUTPATIENT)
Dept: OPHTHALMOLOGY | Facility: CLINIC | Age: 75
End: 2025-02-04
Payer: MEDICARE

## 2025-02-04 ENCOUNTER — NON-APPOINTMENT (OUTPATIENT)
Age: 75
End: 2025-02-04

## 2025-02-04 PROCEDURE — 92136 OPHTHALMIC BIOMETRY: CPT | Mod: RT

## 2025-02-04 PROCEDURE — 99024 POSTOP FOLLOW-UP VISIT: CPT

## 2025-02-20 ENCOUNTER — APPOINTMENT (OUTPATIENT)
Dept: OPHTHALMOLOGY | Facility: CLINIC | Age: 75
End: 2025-02-20

## 2025-02-20 PROCEDURE — 99024 POSTOP FOLLOW-UP VISIT: CPT

## 2025-02-20 PROCEDURE — 92136 OPHTHALMIC BIOMETRY: CPT | Mod: 26,RT

## 2025-02-24 ENCOUNTER — APPOINTMENT (OUTPATIENT)
Dept: OPHTHALMOLOGY | Facility: AMBULATORY MEDICAL SERVICES | Age: 75
End: 2025-02-24
Payer: MEDICARE

## 2025-02-24 PROCEDURE — 66984 XCAPSL CTRC RMVL W/O ECP: CPT | Mod: RT,79

## 2025-02-25 ENCOUNTER — NON-APPOINTMENT (OUTPATIENT)
Age: 75
End: 2025-02-25

## 2025-02-25 ENCOUNTER — APPOINTMENT (OUTPATIENT)
Dept: OPHTHALMOLOGY | Facility: CLINIC | Age: 75
End: 2025-02-25
Payer: MEDICARE

## 2025-02-25 PROCEDURE — 99024 POSTOP FOLLOW-UP VISIT: CPT

## 2025-03-04 ENCOUNTER — APPOINTMENT (OUTPATIENT)
Dept: OPHTHALMOLOGY | Facility: CLINIC | Age: 75
End: 2025-03-04
Payer: MEDICARE

## 2025-03-04 PROCEDURE — 99024 POSTOP FOLLOW-UP VISIT: CPT

## 2025-04-01 ENCOUNTER — NON-APPOINTMENT (OUTPATIENT)
Age: 75
End: 2025-04-01

## 2025-04-01 ENCOUNTER — APPOINTMENT (OUTPATIENT)
Dept: OPHTHALMOLOGY | Facility: CLINIC | Age: 75
End: 2025-04-01
Payer: MEDICARE

## 2025-04-01 PROCEDURE — 99024 POSTOP FOLLOW-UP VISIT: CPT

## 2025-05-08 ENCOUNTER — NON-APPOINTMENT (OUTPATIENT)
Age: 75
End: 2025-05-08

## 2025-05-08 ENCOUNTER — APPOINTMENT (OUTPATIENT)
Dept: OPHTHALMOLOGY | Facility: CLINIC | Age: 75
End: 2025-05-08
Payer: MEDICARE

## 2025-05-08 PROCEDURE — 99024 POSTOP FOLLOW-UP VISIT: CPT

## (undated) DEVICE — POSITIONER PINK PAD PIGAZZI SYSTEM

## (undated) DEVICE — PACK GENERAL LAPAROSCOPY

## (undated) DEVICE — WARMING BLANKET UPPER ADULT

## (undated) DEVICE — SUT VLOC 180 2-0 6" GS-22 GREEN

## (undated) DEVICE — GLV 8 PROTEXIS (WHITE)

## (undated) DEVICE — VENODYNE/SCD SLEEVE CALF MEDIUM

## (undated) DEVICE — GOWN XXXL

## (undated) DEVICE — XI TIP COVER

## (undated) DEVICE — ELCTR GROUNDING PAD ADULT COVIDIEN

## (undated) DEVICE — SOL IRR POUR H2O 1000ML

## (undated) DEVICE — PACK ROBOTIC

## (undated) DEVICE — TUBING AIRSEAL TRI-LUMEN FILTERED

## (undated) DEVICE — PREP TRAY DRY SKIN PREP SCRUB

## (undated) DEVICE — PREP DYNA-HEX CHG 4% 4OZ BOTTLE (BACTOSHIELD)

## (undated) DEVICE — DRAPE ROBOTIC

## (undated) DEVICE — XI OBTURATOR OPTICAL BLADELESS 8MM

## (undated) DEVICE — GLV 8.5 PROTEXIS (WHITE)

## (undated) DEVICE — PREP CHLORAPREP HI-LITE ORANGE 26ML

## (undated) DEVICE — SUT MONOCRYL 2-0 27" SH VIOLET

## (undated) DEVICE — TUBING CONNECTING 6MM 20FT

## (undated) DEVICE — XI DRAPE COLUMN

## (undated) DEVICE — PACK MINOR WITH LAP

## (undated) DEVICE — DRSG DERMABOND 0.7ML

## (undated) DEVICE — SUT MONOCRYL 4-0 27" PS-2 UNDYED

## (undated) DEVICE — XI ARM NEEDLE DRIVER SUTURECUT MEGA 8MM

## (undated) DEVICE — ENDOCATCH 10MM SPECIMEN POUCH

## (undated) DEVICE — XI DRAPE ARM

## (undated) DEVICE — SOL IRR POUR NS 0.9% 1000ML

## (undated) DEVICE — SUT GORETEX CV-2 (0) 36" THX-26 DA

## (undated) DEVICE — TROCAR SURGIQUEST AIRSEAL 8MMX100MM

## (undated) DEVICE — XI ARM FORCEP TENACULUM